# Patient Record
Sex: MALE | Race: OTHER | Employment: OTHER | URBAN - METROPOLITAN AREA
[De-identification: names, ages, dates, MRNs, and addresses within clinical notes are randomized per-mention and may not be internally consistent; named-entity substitution may affect disease eponyms.]

---

## 2021-08-07 ENCOUNTER — HOSPITAL ENCOUNTER (EMERGENCY)
Age: 36
Discharge: HOME | End: 2021-08-07
Payer: COMMERCIAL

## 2021-08-07 VITALS
BODY MASS INDEX: 26.97 KG/M2 | HEIGHT: 69 IN | RESPIRATION RATE: 19 BRPM | DIASTOLIC BLOOD PRESSURE: 71 MMHG | TEMPERATURE: 98.6 F | HEART RATE: 68 BPM | OXYGEN SATURATION: 98 % | WEIGHT: 182.1 LBS | SYSTOLIC BLOOD PRESSURE: 134 MMHG

## 2021-08-07 DIAGNOSIS — R19.7 DIARRHEA, UNSPECIFIED TYPE: Primary | ICD-10-CM

## 2021-08-07 LAB
A/G RATIO: 1.2 (ref 1.1–2.2)
ALBUMIN SERPL-MCNC: 4.2 G/DL (ref 3.4–5)
ALP BLD-CCNC: 127 U/L (ref 40–129)
ALT SERPL-CCNC: 42 U/L (ref 10–40)
ANION GAP SERPL CALCULATED.3IONS-SCNC: 10 MMOL/L (ref 3–16)
AST SERPL-CCNC: 29 U/L (ref 15–37)
BASOPHILS ABSOLUTE: 0.1 K/UL (ref 0–0.2)
BASOPHILS RELATIVE PERCENT: 1.1 %
BILIRUB SERPL-MCNC: 0.4 MG/DL (ref 0–1)
BILIRUBIN URINE: NEGATIVE
BLOOD, URINE: NEGATIVE
BUN BLDV-MCNC: 10 MG/DL (ref 7–20)
CALCIUM SERPL-MCNC: 9.5 MG/DL (ref 8.3–10.6)
CHLORIDE BLD-SCNC: 100 MMOL/L (ref 99–110)
CLARITY: CLEAR
CO2: 25 MMOL/L (ref 21–32)
COLOR: YELLOW
CREAT SERPL-MCNC: 0.7 MG/DL (ref 0.9–1.3)
EOSINOPHILS ABSOLUTE: 0.1 K/UL (ref 0–0.6)
EOSINOPHILS RELATIVE PERCENT: 0.7 %
GFR AFRICAN AMERICAN: >60
GFR NON-AFRICAN AMERICAN: >60
GLOBULIN: 3.4 G/DL
GLUCOSE BLD-MCNC: 90 MG/DL (ref 70–99)
GLUCOSE URINE: NEGATIVE MG/DL
HCT VFR BLD CALC: 44.1 % (ref 40.5–52.5)
HEMOGLOBIN: 15 G/DL (ref 13.5–17.5)
KETONES, URINE: NEGATIVE MG/DL
LEUKOCYTE ESTERASE, URINE: NEGATIVE
LIPASE: 33 U/L (ref 13–60)
LYMPHOCYTES ABSOLUTE: 3 K/UL (ref 1–5.1)
LYMPHOCYTES RELATIVE PERCENT: 36 %
MCH RBC QN AUTO: 31.3 PG (ref 26–34)
MCHC RBC AUTO-ENTMCNC: 34 G/DL (ref 31–36)
MCV RBC AUTO: 92 FL (ref 80–100)
MICROSCOPIC EXAMINATION: NORMAL
MONOCYTES ABSOLUTE: 1.1 K/UL (ref 0–1.3)
MONOCYTES RELATIVE PERCENT: 13.4 %
NEUTROPHILS ABSOLUTE: 4 K/UL (ref 1.7–7.7)
NEUTROPHILS RELATIVE PERCENT: 48.8 %
NITRITE, URINE: NEGATIVE
PDW BLD-RTO: 13.9 % (ref 12.4–15.4)
PH UA: 5.5 (ref 5–8)
PLATELET # BLD: 205 K/UL (ref 135–450)
PMV BLD AUTO: 9.4 FL (ref 5–10.5)
POTASSIUM REFLEX MAGNESIUM: 3.9 MMOL/L (ref 3.5–5.1)
PROTEIN UA: NEGATIVE MG/DL
RBC # BLD: 4.79 M/UL (ref 4.2–5.9)
SODIUM BLD-SCNC: 135 MMOL/L (ref 136–145)
SPECIFIC GRAVITY UA: 1 (ref 1–1.03)
TOTAL PROTEIN: 7.6 G/DL (ref 6.4–8.2)
URINE REFLEX TO CULTURE: NORMAL
URINE TYPE: NORMAL
UROBILINOGEN, URINE: 0.2 E.U./DL
WBC # BLD: 8.2 K/UL (ref 4–11)

## 2021-08-07 PROCEDURE — 2580000003 HC RX 258: Performed by: NURSE PRACTITIONER

## 2021-08-07 PROCEDURE — 6370000000 HC RX 637 (ALT 250 FOR IP): Performed by: NURSE PRACTITIONER

## 2021-08-07 PROCEDURE — 83690 ASSAY OF LIPASE: CPT

## 2021-08-07 PROCEDURE — 99283 EMERGENCY DEPT VISIT LOW MDM: CPT

## 2021-08-07 PROCEDURE — 36415 COLL VENOUS BLD VENIPUNCTURE: CPT

## 2021-08-07 PROCEDURE — 85025 COMPLETE CBC W/AUTO DIFF WBC: CPT

## 2021-08-07 PROCEDURE — 80053 COMPREHEN METABOLIC PANEL: CPT

## 2021-08-07 PROCEDURE — 81003 URINALYSIS AUTO W/O SCOPE: CPT

## 2021-08-07 RX ORDER — 0.9 % SODIUM CHLORIDE 0.9 %
1000 INTRAVENOUS SOLUTION INTRAVENOUS ONCE
Status: COMPLETED | OUTPATIENT
Start: 2021-08-07 | End: 2021-08-07

## 2021-08-07 RX ORDER — DIPHENOXYLATE HYDROCHLORIDE AND ATROPINE SULFATE 2.5; .025 MG/1; MG/1
2 TABLET ORAL ONCE
Status: COMPLETED | OUTPATIENT
Start: 2021-08-07 | End: 2021-08-07

## 2021-08-07 RX ORDER — SACCHAROMYCES BOULARDII 250 MG
250 CAPSULE ORAL 3 TIMES DAILY
Qty: 21 CAPSULE | Refills: 0 | Status: SHIPPED | OUTPATIENT
Start: 2021-08-07 | End: 2021-08-14

## 2021-08-07 RX ORDER — LOPERAMIDE HYDROCHLORIDE 2 MG/1
2 CAPSULE ORAL 4 TIMES DAILY PRN
Qty: 30 CAPSULE | Refills: 0 | Status: SHIPPED | OUTPATIENT
Start: 2021-08-07

## 2021-08-07 RX ADMIN — DIPHENOXYLATE HYDROCHLORIDE AND ATROPINE SULFATE 2 TABLET: 2.5; .025 TABLET ORAL at 20:05

## 2021-08-07 RX ADMIN — SODIUM CHLORIDE 1000 ML: 9 INJECTION, SOLUTION INTRAVENOUS at 20:06

## 2021-08-07 ASSESSMENT — ENCOUNTER SYMPTOMS
BACK PAIN: 0
VOMITING: 0
ABDOMINAL DISTENTION: 0
ABDOMINAL PAIN: 0
DIARRHEA: 1
COLOR CHANGE: 0
NAUSEA: 0
SHORTNESS OF BREATH: 0
BLOOD IN STOOL: 0

## 2021-08-07 ASSESSMENT — PAIN SCALES - GENERAL: PAINLEVEL_OUTOF10: 0

## 2021-08-07 NOTE — ED PROVIDER NOTES
reviewed and are negative. \"Positives and Pertinent negatives as per HPI\"    Physical Exam:  Physical Exam  Vitals and nursing note reviewed. Constitutional:       General: He is not in acute distress. Appearance: Normal appearance. He is well-developed. He is not toxic-appearing. HENT:      Head: Normocephalic and atraumatic. Eyes:      General: No scleral icterus. Conjunctiva/sclera: Conjunctivae normal.   Neck:      Vascular: No JVD. Cardiovascular:      Rate and Rhythm: Normal rate and regular rhythm. Heart sounds: Normal heart sounds. Pulmonary:      Effort: Pulmonary effort is normal. No respiratory distress. Breath sounds: Normal breath sounds. Abdominal:      General: There is no distension. Palpations: Abdomen is soft. Abdomen is not rigid. Tenderness: There is no abdominal tenderness. There is no guarding or rebound. Comments: No abdominal ttp   Musculoskeletal:         General: Normal range of motion. Cervical back: Normal range of motion and neck supple. Skin:     General: Skin is warm and dry. Findings: No rash. Neurological:      General: No focal deficit present. Mental Status: He is alert and oriented to person, place, and time.    Psychiatric:         Mood and Affect: Mood normal.         MEDICAL DECISION MAKING    Vitals:    Vitals:    08/07/21 1810   BP: 118/81   Pulse: 70   Resp: 19   Temp: 97.8 °F (36.6 °C)   SpO2: 98%   Weight: 182 lb 1.6 oz (82.6 kg)   Height: 5' 9\" (1.753 m)       LABS:  Labs Reviewed   COMPREHENSIVE METABOLIC PANEL W/ REFLEX TO MG FOR LOW K - Abnormal; Notable for the following components:       Result Value    Sodium 135 (*)     CREATININE 0.7 (*)     ALT 42 (*)     All other components within normal limits    Narrative:     Performed at:  [address] [address]   Phone [phone]   CBC WITH AUTO DIFFERENTIAL    Narrative:     Performed This was explained to the patient. The patient was advised that persistent or worsening symptoms will require further evaluation. I discussed with Amy Rivero and/or family the exam results, diagnosis, care, prognosis, reasons to return and the importance of follow up. Patient and/or family is in full agreement with plan and all questions have been answered. Specific discharge instructions explained, including reasons to return to the emergency department. Amy Rivero is well appearing, non-toxic, and afebrile at the time of discharge. Patient was instructed to follow up with primary care provider in 24-48 hours, and to instructed to return to ED immediately for any new or worsening concerns. Amy Rivero verbalized understanding and discharged home. The patient tolerated their visit well. I evaluated the patient. The physician was available for consultation as needed. The patient and / or the family were informed of the results of any tests, a time was given to answer questions, a plan was proposed and they agreed with plan. CLINICAL IMPRESSION:  1.  Diarrhea, unspecified type        DISPOSITION        PATIENT REFERRED TO:  [facility]) Pre-Services  [phone]  Call       [facility] Emergency Department  [address] [place] [address]  [phone]  Go to   As needed      DISCHARGE MEDICATIONS:  New Prescriptions    LOPERAMIDE (RA ANTI-DIARRHEAL) 2 MG CAPSULE    Take 1 capsule by mouth 4 times daily as needed for Diarrhea    SACCHAROMYCES BOULARDII (FLORASTOR) 250 MG CAPSULE    Take 1 capsule by mouth 3 times daily for 7 days       DISCONTINUED MEDICATIONS:  Discontinued Medications    No medications on file              (Please note the MDM and HPI sections of this note were completed with a voice recognition program.  Efforts were made to edit the dictations but occasionally words are mis-transcribed.)    Electronically signed, REINIER Diaz CNP,

## 2022-12-13 ENCOUNTER — HOSPITAL ENCOUNTER (EMERGENCY)
Age: 37
Discharge: HOME | End: 2022-12-13
Payer: COMMERCIAL

## 2022-12-13 VITALS
HEART RATE: 66 BPM | BODY MASS INDEX: 27.58 KG/M2 | SYSTOLIC BLOOD PRESSURE: 127 MMHG | RESPIRATION RATE: 16 BRPM | DIASTOLIC BLOOD PRESSURE: 80 MMHG | OXYGEN SATURATION: 100 % | WEIGHT: 186.73 LBS | TEMPERATURE: 97 F

## 2022-12-13 DIAGNOSIS — H93.8X3 EAR FULLNESS, BILATERAL: Primary | ICD-10-CM

## 2022-12-13 PROCEDURE — 99283 EMERGENCY DEPT VISIT LOW MDM: CPT

## 2022-12-13 RX ORDER — LORATADINE 10 MG/1
10 TABLET ORAL DAILY
Qty: 30 TABLET | Refills: 1 | Status: SHIPPED | OUTPATIENT
Start: 2022-12-13

## 2022-12-13 ASSESSMENT — PAIN - FUNCTIONAL ASSESSMENT
PAIN_FUNCTIONAL_ASSESSMENT: 0-10
PAIN_FUNCTIONAL_ASSESSMENT: 0-10

## 2022-12-13 ASSESSMENT — PAIN SCALES - GENERAL
PAINLEVEL_OUTOF10: 0
PAINLEVEL_OUTOF10: 5

## 2022-12-13 ASSESSMENT — PAIN DESCRIPTION - ORIENTATION: ORIENTATION: LEFT;RIGHT

## 2022-12-13 ASSESSMENT — PAIN DESCRIPTION - LOCATION: LOCATION: EAR

## 2022-12-13 NOTE — ED PROVIDER NOTES
[address]  [address] [address]  Dept: [phone]  Loc: [address] ENCOUNTER        This patient was not seen or evaluated by the attending physician. I evaluated this patient, the attending physician was available for consultation. CHIEF COMPLAINT    Chief Complaint   Patient presents with    Ear Fullness     Pt states ear pain and fullness in both ears        HPI    Shea Huber is a 42 y.o. male who presents with ear pain and fullness, localized on the right and left side. Onset was \"a while ago\". The duration has been constant since the onset. Patient complains of associated muffled hearing at times as well. He is also concerned about wax buildup. No aggravating or alleviating factors. He is requesting an ENT referral due to the chronicity of his symptoms. He has no further complaints. REVIEW OF SYSTEMS    Pulmonary: No difficulty breathing   General: No fevers  GI: No vomiting  ENT: see HPI    PAST MEDICAL AND SURGICAL HISTORY    No past medical history on file. No past surgical history on file. CURRENT MEDICATIONS  (may include discharge medications prescribed in the ED)  Current Outpatient Rx   Medication Sig Dispense Refill    loratadine (CLARITIN) 10 MG tablet Take 1 tablet by mouth daily 30 tablet 1    loperamide (RA ANTI-DIARRHEAL) 2 MG capsule Take 1 capsule by mouth 4 times daily as needed for Diarrhea 30 capsule 0       ALLERGIES    No Known Allergies    FAMILY AND SOCIAL HISTORY    No family history on file.   Social History     Socioeconomic History    Marital status:        PHYSICAL EXAM    VITAL SIGNS: /80   Pulse 80   Temp 97.9 °F (36.6 °C) (Oral)   Resp 20   Wt 186 lb 11.7 oz (84.7 kg)   SpO2 100%   BMI 27.58 kg/m²   Constitutional:  Well nourished, no acute distress  Eyes: Sclera nonicteric, conjunctiva normal   Throat/Face:  nonerythematous throat, no trismus, no lymphadenopathy  Ears: The right ear canal appears clear with no impacted wax and the ipsilateral TM appears clear and nonbulging, mastoid and pinna are without redness or swelling, no mastoid tenderness. The left ear canal appears clear with no impacted wax and the ipsilateral TM appears clear and nonbulging, mastoid and pinna are without redness or swelling, no mastoid tenderness    Respiratory:  No retractions  Cardiovascular:  No JVD  Neurologic: Awake, alert, no slurred speech    RADIOLOGY   No orders to display       ED [address] [facility]    Please see the medical record for medications prescribed. Differential diagnoses: Mastoiditis, Auricular cellulitis, Malignant otitis externa, Otitis media, Subarachnoid hemorrhage, Odontogenic infection, TMJ syndrome, eustachian tube dysfunction, other. Patient is afebrile and nontoxic in appearance. He will be discharged home with Claritin, referral to ENT, primary care. The patient was instructed to follow up as an outpatient in 2 days. The patient was instructed to return to the ED immediately for any new or worsening symptoms. The patient verbalized understanding. FINAL IMPRESSION    1.  Ear fullness, bilateral        PLAN  Discharge with outpatient follow-up and discharge Instructions (see EMR)      (Please note that this note was completed with a voice recognition program.  Every attempt was made to edit the dictations, but inevitably there remain words that are mis-transcribed.)         Kezia Archuleta [place]  12/13/22 8608

## 2022-12-13 NOTE — ED NOTES
Discharge and education instructions reviewed. Patient verbalized understanding, teach-back successful. Patient denied questions at this time. No acute distress noted. Patient instructed to follow-up as noted - return to emergency department if symptoms worsen. Patient verbalized understanding. Discharged per EDMD with discharge instructions.         Marleen Iraheta RN  12/13/22 6171

## 2022-12-20 ENCOUNTER — OFFICE VISIT (OUTPATIENT)
Dept: INTERNAL MEDICINE CLINIC | Age: 37
End: 2022-12-20

## 2022-12-20 VITALS
WEIGHT: 186.2 LBS | HEIGHT: 69 IN | HEART RATE: 92 BPM | OXYGEN SATURATION: 98 % | BODY MASS INDEX: 27.58 KG/M2 | SYSTOLIC BLOOD PRESSURE: 124 MMHG | RESPIRATION RATE: 18 BRPM | DIASTOLIC BLOOD PRESSURE: 84 MMHG

## 2022-12-20 DIAGNOSIS — H92.03 OTALGIA OF BOTH EARS: Primary | ICD-10-CM

## 2022-12-20 DIAGNOSIS — Z00.00 ROUTINE CHECK-UP: ICD-10-CM

## 2022-12-20 LAB
A/G RATIO: 1.2 (ref 1.1–2.2)
ALBUMIN SERPL-MCNC: 4.2 G/DL (ref 3.4–5)
ALP BLD-CCNC: 118 U/L (ref 40–129)
ALT SERPL-CCNC: 33 U/L (ref 10–40)
ANION GAP SERPL CALCULATED.3IONS-SCNC: 12 MMOL/L (ref 3–16)
AST SERPL-CCNC: 21 U/L (ref 15–37)
BASOPHILS ABSOLUTE: 0.1 K/UL (ref 0–0.2)
BASOPHILS RELATIVE PERCENT: 1.1 %
BILIRUB SERPL-MCNC: 0.6 MG/DL (ref 0–1)
BUN BLDV-MCNC: 22 MG/DL (ref 7–20)
CALCIUM SERPL-MCNC: 10.1 MG/DL (ref 8.3–10.6)
CHLORIDE BLD-SCNC: 99 MMOL/L (ref 99–110)
CHOLESTEROL, TOTAL: 224 MG/DL (ref 0–199)
CO2: 27 MMOL/L (ref 21–32)
CREAT SERPL-MCNC: 1 MG/DL (ref 0.9–1.3)
EOSINOPHILS ABSOLUTE: 0.4 K/UL (ref 0–0.6)
EOSINOPHILS RELATIVE PERCENT: 4 %
FOLATE: 11.89 NG/ML (ref 4.78–24.2)
GFR SERPL CREATININE-BSD FRML MDRD: >60 ML/MIN/{1.73_M2}
GLUCOSE BLD-MCNC: 89 MG/DL (ref 70–99)
HCT VFR BLD CALC: 46.8 % (ref 40.5–52.5)
HDLC SERPL-MCNC: 40 MG/DL (ref 40–60)
HEMOGLOBIN: 15.2 G/DL (ref 13.5–17.5)
LDL CHOLESTEROL CALCULATED: 135 MG/DL
LYMPHOCYTES ABSOLUTE: 4 K/UL (ref 1–5.1)
LYMPHOCYTES RELATIVE PERCENT: 41.7 %
MCH RBC QN AUTO: 31.4 PG (ref 26–34)
MCHC RBC AUTO-ENTMCNC: 32.5 G/DL (ref 31–36)
MCV RBC AUTO: 96.7 FL (ref 80–100)
MONOCYTES ABSOLUTE: 1 K/UL (ref 0–1.3)
MONOCYTES RELATIVE PERCENT: 10.8 %
NEUTROPHILS ABSOLUTE: 4.1 K/UL (ref 1.7–7.7)
NEUTROPHILS RELATIVE PERCENT: 42.4 %
PDW BLD-RTO: 14.1 % (ref 12.4–15.4)
PLATELET # BLD: 290 K/UL (ref 135–450)
PMV BLD AUTO: 9.4 FL (ref 5–10.5)
POTASSIUM SERPL-SCNC: 4.4 MMOL/L (ref 3.5–5.1)
RBC # BLD: 4.84 M/UL (ref 4.2–5.9)
SODIUM BLD-SCNC: 138 MMOL/L (ref 136–145)
TOTAL PROTEIN: 7.6 G/DL (ref 6.4–8.2)
TRIGL SERPL-MCNC: 244 MG/DL (ref 0–150)
VITAMIN B-12: 535 PG/ML (ref 211–911)
VITAMIN D 25-HYDROXY: 74.5 NG/ML
VLDLC SERPL CALC-MCNC: 49 MG/DL
WBC # BLD: 9.6 K/UL (ref 4–11)

## 2022-12-20 SDOH — ECONOMIC STABILITY: TRANSPORTATION INSECURITY
IN THE PAST 12 MONTHS, HAS LACK OF TRANSPORTATION KEPT YOU FROM MEETINGS, WORK, OR FROM GETTING THINGS NEEDED FOR DAILY LIVING?: NO

## 2022-12-20 SDOH — ECONOMIC STABILITY: FOOD INSECURITY: WITHIN THE PAST 12 MONTHS, YOU WORRIED THAT YOUR FOOD WOULD RUN OUT BEFORE YOU GOT MONEY TO BUY MORE.: NEVER TRUE

## 2022-12-20 SDOH — ECONOMIC STABILITY: FOOD INSECURITY: WITHIN THE PAST 12 MONTHS, THE FOOD YOU BOUGHT JUST DIDN'T LAST AND YOU DIDN'T HAVE MONEY TO GET MORE.: NEVER TRUE

## 2022-12-20 SDOH — ECONOMIC STABILITY: TRANSPORTATION INSECURITY
IN THE PAST 12 MONTHS, HAS THE LACK OF TRANSPORTATION KEPT YOU FROM MEDICAL APPOINTMENTS OR FROM GETTING MEDICATIONS?: NO

## 2022-12-20 ASSESSMENT — SOCIAL DETERMINANTS OF HEALTH (SDOH): HOW HARD IS IT FOR YOU TO PAY FOR THE VERY BASICS LIKE FOOD, HOUSING, MEDICAL CARE, AND HEATING?: NOT HARD AT ALL

## 2022-12-20 ASSESSMENT — PATIENT HEALTH QUESTIONNAIRE - PHQ9
SUM OF ALL RESPONSES TO PHQ QUESTIONS 1-9: 0
SUM OF ALL RESPONSES TO PHQ QUESTIONS 1-9: 0
SUM OF ALL RESPONSES TO PHQ9 QUESTIONS 1 & 2: 0
SUM OF ALL RESPONSES TO PHQ QUESTIONS 1-9: 0
1. LITTLE INTEREST OR PLEASURE IN DOING THINGS: 0
SUM OF ALL RESPONSES TO PHQ QUESTIONS 1-9: 0
2. FEELING DOWN, DEPRESSED OR HOPELESS: 0

## 2022-12-20 ASSESSMENT — ENCOUNTER SYMPTOMS
BLOOD IN STOOL: 0
ABDOMINAL PAIN: 0
CHEST TIGHTNESS: 0
ABDOMINAL DISTENTION: 0
SORE THROAT: 0
BACK PAIN: 0
SHORTNESS OF BREATH: 0
COUGH: 0

## 2022-12-20 NOTE — PROGRESS NOTES
[facility]) Internal Medicine  Mr. Alon Ojeda is a 68-year-old male with a past medical history as listed below who presents for routine follow-up. Bilateral ear fullness: Patient feels bilateral ear fullness. He notices difficulty with hearing. He has noticed wax draining from his ear. He denies any fevers or chills. Patient has not followed with a primary care doctor. He is interested in getting labs today. He wants to make sure that he does not have any vitamin deficiencies. Review of Systems   Constitutional:  Negative for fatigue and fever. HENT:  Negative for congestion, nosebleeds and sore throat. Respiratory:  Negative for cough, chest tightness and shortness of breath. Cardiovascular:  Negative for chest pain, palpitations and leg swelling. Gastrointestinal:  Negative for abdominal distention, abdominal pain and blood in stool. Endocrine: Negative for cold intolerance and heat intolerance. Genitourinary:  Negative for difficulty urinating. Musculoskeletal:  Negative for back pain. Neurological:  Negative for weakness, light-headedness and numbness. Psychiatric/Behavioral:  Negative for confusion and sleep disturbance. History reviewed. No pertinent past medical history. No past surgical history on file.     Social History     Socioeconomic History    Marital status:      Spouse name: Not on file    Number of children: Not on file    Years of education: Not on file    Highest education level: Not on file   Occupational History    Not on file   Tobacco Use    Smoking status: Never    Smokeless tobacco: Never   Substance and Sexual Activity    Alcohol use: Never    Drug use: Not on file    Sexual activity: Not on file   Other Topics Concern    Not on file   Social History Narrative    Not on file     Social Determinants of Health     Financial Resource Strain: Low Risk     Difficulty of Paying Living Expenses: Not hard at all   Food Insecurity: No Food Insecurity    Worried About Running Out of Food in the Last Year: Never true    [address] in the Last Year: Never true   Transportation Needs: No Transportation Needs    Lack of Transportation (Medical): No    Lack of Transportation (Non-Medical): No   Physical Activity: Not on file   Stress: Not on file   Social Connections: Not on file   Intimate Partner Violence: Not on file   Housing Stability: Not on file       No family history on file. No current outpatient medications on file. Examination  Vitals:    12/20/22 1400   BP: 124/84   Site: Left Upper Arm   Position: Sitting   Cuff Size: Large Adult   Pulse: 92   Resp: 18   SpO2: 98%   Weight: 186 lb 3.2 oz (84.5 kg)   Height: 5' 9\" (1.753 m)      Physical Exam  Constitutional:       General: He is not in acute distress. HENT:      Ears:      Comments: Cerumen in bilateral ear canals unable to visualize TM     Mouth/Throat:      Mouth: Mucous membranes are moist.   Eyes:      Pupils: Pupils are equal, round, and reactive to light. Cardiovascular:      Rate and Rhythm: Normal rate and regular rhythm. Pulses: Normal pulses. Pulmonary:      Effort: Pulmonary effort is normal. No respiratory distress. Breath sounds: Normal breath sounds. No wheezing, rhonchi or rales. Abdominal:      General: Abdomen is flat. There is no distension. Palpations: Abdomen is soft. Tenderness: There is no abdominal tenderness. Skin:     General: Skin is warm and dry. Coloration: Skin is not jaundiced or pale. Findings: No erythema. Neurological:      General: No focal deficit present. Mental Status: He is alert and oriented to person, place, and time. Assessment and Plan  Otalgia of both ears   Patient request ENT referral to have his ears cleaned. We did offer to clean out ears in office.   Patient prefers to go to ENT  -ENT referral    Routine check-up   - CBC CMP and vitamin D ordered       Discussed use, benefit, and side effects of prescribed medications. Barriers to medication compliance addressed. Discussed all ordered testing and labs. All patient questions answered. Patient agreeable with plan above. Please note that this chart was generated using dragon dictation software. Although every effort was made to ensure the accuracy of this automated transcription, some errors in transcription may have occurred.

## 2022-12-20 NOTE — ASSESSMENT & PLAN NOTE
Patient request ENT referral to have his ears cleaned. We did offer to clean out ears in office.   Patient prefers to go to ENT  -ENT referral

## 2022-12-20 NOTE — PATIENT INSTRUCTIONS
Call [phone] to schedule with ENT doctor.
urinary frequency and dysuria, as per  pt gets UTI's frequently. Pt also c/o back and supra pubic pain. A&ox4

## 2022-12-22 ENCOUNTER — TELEPHONE (OUTPATIENT)
Dept: INTERNAL MEDICINE CLINIC | Age: 37
End: 2022-12-22

## 2023-01-03 ENCOUNTER — OFFICE VISIT (OUTPATIENT)
Dept: ENT CLINIC | Age: 38
End: 2023-01-03
Payer: COMMERCIAL

## 2023-01-03 VITALS
HEART RATE: 73 BPM | RESPIRATION RATE: 16 BRPM | HEIGHT: 69 IN | SYSTOLIC BLOOD PRESSURE: 118 MMHG | OXYGEN SATURATION: 98 % | DIASTOLIC BLOOD PRESSURE: 84 MMHG | WEIGHT: 184 LBS | BODY MASS INDEX: 27.25 KG/M2

## 2023-01-03 DIAGNOSIS — R06.83 SNORING: ICD-10-CM

## 2023-01-03 DIAGNOSIS — J34.3 HYPERTROPHY OF BOTH INFERIOR NASAL TURBINATES: ICD-10-CM

## 2023-01-03 DIAGNOSIS — J31.2 CHRONIC PHARYNGITIS: ICD-10-CM

## 2023-01-03 DIAGNOSIS — H91.93 BILATERAL HEARING LOSS, UNSPECIFIED HEARING LOSS TYPE: Primary | ICD-10-CM

## 2023-01-03 DIAGNOSIS — H93.8X3 SENSATION OF FULLNESS IN BOTH EARS: ICD-10-CM

## 2023-01-03 DIAGNOSIS — J34.2 DEVIATED NASAL SEPTUM: ICD-10-CM

## 2023-01-03 DIAGNOSIS — J35.1 TONSILLAR HYPERTROPHY: ICD-10-CM

## 2023-01-03 PROCEDURE — 99203 OFFICE O/P NEW LOW 30 MIN: CPT | Performed by: STUDENT IN AN ORGANIZED HEALTH CARE EDUCATION/TRAINING PROGRAM

## 2023-01-03 PROCEDURE — 1036F TOBACCO NON-USER: CPT | Performed by: STUDENT IN AN ORGANIZED HEALTH CARE EDUCATION/TRAINING PROGRAM

## 2023-01-03 PROCEDURE — G8427 DOCREV CUR MEDS BY ELIG CLIN: HCPCS | Performed by: STUDENT IN AN ORGANIZED HEALTH CARE EDUCATION/TRAINING PROGRAM

## 2023-01-03 PROCEDURE — G8419 CALC BMI OUT NRM PARAM NOF/U: HCPCS | Performed by: STUDENT IN AN ORGANIZED HEALTH CARE EDUCATION/TRAINING PROGRAM

## 2023-01-03 PROCEDURE — G8484 FLU IMMUNIZE NO ADMIN: HCPCS | Performed by: STUDENT IN AN ORGANIZED HEALTH CARE EDUCATION/TRAINING PROGRAM

## 2023-01-03 NOTE — PROGRESS NOTES
[facility]  DIVISION OF OTOLARYNGOLOGY- HEAD & NECK SURGERY  CONSULT      Juan A Carlos (:  1985) is a 42 y.o. male, here for evaluation of the following chief complaint(s):  Cerumen Impaction (Bilateral)      ASSESSMENT/PLAN:  1. Bilateral hearing loss, unspecified hearing loss type  2. Sensation of fullness in both ears  3. Tonsillar hypertrophy  4. Chronic pharyngitis  5. Snoring  6. Deviated nasal septum  7. Hypertrophy of both inferior nasal turbinates      This is a very pleasant 42 y.o. male here today for evaluation of the the above-noted complaints. Cerumen removed from external auditory canals on exam.  Discussed with the patient cerumen management strategies. Recommend that the patient follow-up with an audiogram based on his degree of hearing loss, is more than I would expect based on the amount of cerumen he had in his ears. Patient has a history of nasal congestion, snoring chronic pharyngitis the setting of nasal obstruction with deviated nasal septum and turbinate hypertrophy and tonsillar hypertrophy. I recommended that the patient undergo a sleep study. He would like to consider this. Discussed with the patient that snoring is multifactorial.  Tonsillectomy could be done to address his chronic pharyngitis. He would like to think about this. Medical Decision Making: The following items were considered in medical decision making:  Independent review of images  Review / order clinical lab tests  Review / order radiology tests  Decision to obtain old records  Review and summation of old records as accessed through [facility] if applicable    SUBJECTIVE/OBJECTIVE:  ARIEL Pryor is here today for evaluation of possible complaints. The patient states that his ears feel full, he has evidence of earwax coming out of his ear canals and he feels like his hearing is diminished. He has never had his ears cleaned in the past.  No history of prior ear surgery.   No tinnitus. No dizziness or vertigo. Patient also has a history of chronic pharyngitis. He gets sore throats at least once a month. He has been on antibiotics for this in the past.  Patient also has intermittent difficulty breathing through his nose and nasal obstruction. Is not using medications in his nose. He admits to snoring, but no witnessed apneas. No issues related to increased daytime sleepiness. No frequent sinus infections. REVIEW OF SYSTEMS  The following systems were reviewed and revealed the following in addition to any already discussed in the HPI:    PHYSICAL EXAM    GENERAL: No acute distress, alert and oriented, no hoarseness, strong voice  EYES: EOMI, Anti-icteric  HENT:   Head: Normocephalic and atraumatic. Face:  Symmetric, facial nerve intact  Mouth/Oral Cavity:  normal lips, Uvula is midline, no mucosal lesions, no trismus, normal dentition, normal salivary quality/flow  Oropharynx/Larynx:  normal oropharynx, 4+ tonsils  Nose:Normal external nasal appearance. Anterior rhinoscopy shows  a deviated septum preventing view posteriorly. Significant hypertrophy of turbinates. Normal mucosa   NECK: Normal range of motion, no thyromegaly, trachea is midline, no lymphadenopathy, no neck masses, no crepitus  CHEST: Normal respiratory effort, no retractions, breathing comfortably  SKIN: No rashes, normal appearing skin, no evidence of skin lesions/tumors  Neuro:  cranial nerve II-XII intact; normal gait  Cardio:  no edema        PROCEDURE    Use of Operating Microscope and Cerumen Removal CPT code [id number]  Indications: Bilateral cerumen impaction obstructing visualization of the tympanic membrane(s). An operating microscope was utilized to visualize the external auditory canals using a speculum. The external auditory canals were occluded with cerumen bilaterally.  The cerumen and debris was removed with instrumentation including suction and currettes under microscopic evaluation. The bilateral tympanic membrane(s) and ossicles are intact. No fluid was visualized in the bilateral middle ears. This note was generated completely or in part utilizing Dragon dictation speech recognition software. Occasionally, words are mistranscribed and despite editing, the text may contain inaccuracies due to incorrect word recognition. If further clarification is needed please contact the office at [phone]. An electronic signature was used to authenticate this note.     --Hitesh Keith MD

## 2023-01-12 ENCOUNTER — APPOINTMENT (OUTPATIENT)
Dept: GENERAL RADIOLOGY | Age: 38
End: 2023-01-12
Payer: COMMERCIAL

## 2023-01-12 ENCOUNTER — HOSPITAL ENCOUNTER (EMERGENCY)
Age: 38
Discharge: HOME | End: 2023-01-13
Payer: COMMERCIAL

## 2023-01-12 VITALS
HEART RATE: 87 BPM | OXYGEN SATURATION: 96 % | DIASTOLIC BLOOD PRESSURE: 83 MMHG | RESPIRATION RATE: 16 BRPM | SYSTOLIC BLOOD PRESSURE: 120 MMHG | TEMPERATURE: 97.7 F

## 2023-01-12 DIAGNOSIS — R07.89 CHEST WALL PAIN: Primary | ICD-10-CM

## 2023-01-12 DIAGNOSIS — R74.01 ELEVATED ALANINE AMINOTRANSFERASE (ALT) LEVEL: ICD-10-CM

## 2023-01-12 LAB
A/G RATIO: 1.1 (ref 1.1–2.2)
ALBUMIN SERPL-MCNC: 3.8 G/DL (ref 3.4–5)
ALP BLD-CCNC: 114 U/L (ref 40–129)
ALT SERPL-CCNC: 49 U/L (ref 10–40)
ANION GAP SERPL CALCULATED.3IONS-SCNC: 9 MMOL/L (ref 3–16)
AST SERPL-CCNC: 30 U/L (ref 15–37)
BASOPHILS ABSOLUTE: 0.2 K/UL (ref 0–0.2)
BASOPHILS RELATIVE PERCENT: 1.5 %
BILIRUB SERPL-MCNC: 0.3 MG/DL (ref 0–1)
BUN BLDV-MCNC: 24 MG/DL (ref 7–20)
CALCIUM SERPL-MCNC: 9.3 MG/DL (ref 8.3–10.6)
CHLORIDE BLD-SCNC: 103 MMOL/L (ref 99–110)
CO2: 23 MMOL/L (ref 21–32)
CREAT SERPL-MCNC: 1 MG/DL (ref 0.9–1.3)
EOSINOPHILS ABSOLUTE: 0.5 K/UL (ref 0–0.6)
EOSINOPHILS RELATIVE PERCENT: 5.1 %
GFR SERPL CREATININE-BSD FRML MDRD: >60 ML/MIN/{1.73_M2}
GLUCOSE BLD-MCNC: 96 MG/DL (ref 70–99)
HCT VFR BLD CALC: 44.9 % (ref 40.5–52.5)
HEMOGLOBIN: 14.9 G/DL (ref 13.5–17.5)
LYMPHOCYTES ABSOLUTE: 4.1 K/UL (ref 1–5.1)
LYMPHOCYTES RELATIVE PERCENT: 39.4 %
MCH RBC QN AUTO: 31.7 PG (ref 26–34)
MCHC RBC AUTO-ENTMCNC: 33.1 G/DL (ref 31–36)
MCV RBC AUTO: 95.7 FL (ref 80–100)
MONOCYTES ABSOLUTE: 1.1 K/UL (ref 0–1.3)
MONOCYTES RELATIVE PERCENT: 10.3 %
NEUTROPHILS ABSOLUTE: 4.6 K/UL (ref 1.7–7.7)
NEUTROPHILS RELATIVE PERCENT: 43.7 %
PDW BLD-RTO: 13.7 % (ref 12.4–15.4)
PLATELET # BLD: 303 K/UL (ref 135–450)
PMV BLD AUTO: 8.8 FL (ref 5–10.5)
POTASSIUM REFLEX MAGNESIUM: 3.9 MMOL/L (ref 3.5–5.1)
RBC # BLD: 4.69 M/UL (ref 4.2–5.9)
SODIUM BLD-SCNC: 135 MMOL/L (ref 136–145)
TOTAL PROTEIN: 7.2 G/DL (ref 6.4–8.2)
TROPONIN: <0.01 NG/ML
TROPONIN: <0.01 NG/ML
WBC # BLD: 10.4 K/UL (ref 4–11)

## 2023-01-12 PROCEDURE — 85025 COMPLETE CBC W/AUTO DIFF WBC: CPT

## 2023-01-12 PROCEDURE — 99285 EMERGENCY DEPT VISIT HI MDM: CPT

## 2023-01-12 PROCEDURE — 80053 COMPREHEN METABOLIC PANEL: CPT

## 2023-01-12 PROCEDURE — 6370000000 HC RX 637 (ALT 250 FOR IP): Performed by: NURSE PRACTITIONER

## 2023-01-12 PROCEDURE — 93005 ELECTROCARDIOGRAM TRACING: CPT | Performed by: NURSE PRACTITIONER

## 2023-01-12 PROCEDURE — 84484 ASSAY OF TROPONIN QUANT: CPT

## 2023-01-12 PROCEDURE — 71046 X-RAY EXAM CHEST 2 VIEWS: CPT

## 2023-01-12 RX ORDER — ACETAMINOPHEN 500 MG
500 TABLET ORAL 4 TIMES DAILY PRN
Qty: 28 TABLET | Refills: 0 | Status: SHIPPED | OUTPATIENT
Start: 2023-01-12 | End: 2023-01-19

## 2023-01-12 RX ORDER — LIDOCAINE 50 MG/G
1 PATCH TOPICAL DAILY
Qty: 10 PATCH | Refills: 0 | Status: SHIPPED | OUTPATIENT
Start: 2023-01-12 | End: 2023-01-20 | Stop reason: ALTCHOICE

## 2023-01-12 RX ORDER — ASPIRIN 81 MG/1
324 TABLET, CHEWABLE ORAL ONCE
Status: COMPLETED | OUTPATIENT
Start: 2023-01-12 | End: 2023-01-12

## 2023-01-12 RX ORDER — LIDOCAINE 4 G/G
1 PATCH TOPICAL ONCE
Status: DISCONTINUED | OUTPATIENT
Start: 2023-01-12 | End: 2023-01-13 | Stop reason: HOSPADM

## 2023-01-12 RX ORDER — ACETAMINOPHEN 500 MG
1000 TABLET ORAL ONCE
Status: COMPLETED | OUTPATIENT
Start: 2023-01-12 | End: 2023-01-12

## 2023-01-12 RX ADMIN — ACETAMINOPHEN 1000 MG: 500 TABLET ORAL at 19:56

## 2023-01-12 RX ADMIN — ASPIRIN 81 MG CHEWABLE TABLET 324 MG: 81 TABLET CHEWABLE at 19:54

## 2023-01-12 ASSESSMENT — PAIN - FUNCTIONAL ASSESSMENT
PAIN_FUNCTIONAL_ASSESSMENT: PREVENTS OR INTERFERES SOME ACTIVE ACTIVITIES AND ADLS
PAIN_FUNCTIONAL_ASSESSMENT: 0-10

## 2023-01-12 ASSESSMENT — PAIN SCALES - GENERAL: PAINLEVEL_OUTOF10: 6

## 2023-01-12 ASSESSMENT — PAIN DESCRIPTION - DESCRIPTORS: DESCRIPTORS: PRESSURE

## 2023-01-12 ASSESSMENT — PAIN DESCRIPTION - FREQUENCY: FREQUENCY: CONTINUOUS

## 2023-01-12 ASSESSMENT — PAIN DESCRIPTION - ORIENTATION: ORIENTATION: LEFT

## 2023-01-12 ASSESSMENT — PAIN DESCRIPTION - ONSET: ONSET: SUDDEN

## 2023-01-12 ASSESSMENT — PAIN DESCRIPTION - PAIN TYPE: TYPE: ACUTE PAIN

## 2023-01-12 ASSESSMENT — PAIN DESCRIPTION - LOCATION: LOCATION: CHEST

## 2023-01-12 ASSESSMENT — HEART SCORE: ECG: 0

## 2023-01-13 LAB
EKG ATRIAL RATE: 63 BPM
EKG ATRIAL RATE: 76 BPM
EKG DIAGNOSIS: NORMAL
EKG DIAGNOSIS: NORMAL
EKG P AXIS: 32 DEGREES
EKG P AXIS: 36 DEGREES
EKG P-R INTERVAL: 128 MS
EKG P-R INTERVAL: 128 MS
EKG Q-T INTERVAL: 346 MS
EKG Q-T INTERVAL: 382 MS
EKG QRS DURATION: 74 MS
EKG QRS DURATION: 76 MS
EKG QTC CALCULATION (BAZETT): 389 MS
EKG QTC CALCULATION (BAZETT): 390 MS
EKG R AXIS: 5 DEGREES
EKG R AXIS: 7 DEGREES
EKG T AXIS: 0 DEGREES
EKG T AXIS: 14 DEGREES
EKG VENTRICULAR RATE: 63 BPM
EKG VENTRICULAR RATE: 76 BPM
TROPONIN: <0.01 NG/ML

## 2023-01-13 PROCEDURE — 93010 ELECTROCARDIOGRAM REPORT: CPT | Performed by: INTERNAL MEDICINE

## 2023-01-13 PROCEDURE — 93005 ELECTROCARDIOGRAM TRACING: CPT | Performed by: NURSE PRACTITIONER

## 2023-01-13 NOTE — ED PROVIDER NOTES
The Ekg interpreted by me shows  normal sinus rhythm with a rate of 76  Axis is   36  QTc is  normal  Intervals and Durations are unremarkable.       ST Segments: normal  No prior ECG available for comparison          Kayla Kevin MD  01/12/23 7403

## 2023-01-13 NOTE — DISCHARGE INSTRUCTIONS
Return to emergency room for new or worsening symptoms including, not limited to developing chest pain accompanied by nausea, vomiting, dizziness, shortness of breath, passing out, feeling as if you are going to pass out, or other symptoms/concerns. Medication as prescribed. Please follow-up with your primary care doctor for reevaluation next 1-2 days. Your primary care doctor may recommend you follow-up with cardiology department for evaluation of persistent symptoms.

## 2023-01-15 ASSESSMENT — ENCOUNTER SYMPTOMS
NAUSEA: 0
SHORTNESS OF BREATH: 0
EYE PAIN: 0
ANAL BLEEDING: 0
VOMITING: 0
ABDOMINAL PAIN: 0
SORE THROAT: 0
COUGH: 0
BACK PAIN: 0

## 2023-01-15 NOTE — ED PROVIDER NOTES
[address] [facility]        Pt Name: Chad Rodney  MRN: [id number]  [place] 1985  Date of evaluation: 1/12/2023  Provider: REINIER Rashid - KARIME  PCP: Bj Pierre MD  Note Started: 6:30 PM EST 1/15/23      PINO. I have evaluated this patient. My supervising physician was available for consultation. CHIEF COMPLAINT       Chief Complaint   Patient presents with    Chest Pain       HISTORY OF PRESENT ILLNESS: 1 or more Elements     History from : Patient    Limitations to history : None    Chad Rodney is a 42 y.o. nontoxic, well appearing male appearing in no acute distress without pertinent medical history who presents to the emergency department with acute [email or web address] hrs. with \"sharp and bubbles, pressure\" 6/10 left-sided chest pain. The pain has been constant since onset. The pain is not pleuritic and does not radiate. Denies ripping or tearing sensation, nausea, vomiting, dizziness, presyncope, lightheadedness, diaphoresis, fever, chills, shortness of breath, cough, changeability smell/taste, hemoptysis, leg/calf pain or swelling, headaches, body aches, abdominal pain, diarrhea, other symptoms/concerns    Nursing Notes were all reviewed and agreed with or any disagreements were addressed in the HPI. REVIEW OF SYSTEMS :      Review of Systems   Constitutional:  Negative for chills, diaphoresis, fatigue and fever. HENT:  Negative for congestion and sore throat. Eyes:  Negative for pain and visual disturbance. Respiratory:  Negative for cough and shortness of breath. Cardiovascular:  Positive for chest pain. Negative for leg swelling. Gastrointestinal:  Negative for abdominal pain, anal bleeding, nausea and vomiting. Genitourinary:  Negative for difficulty urinating, dysuria, frequency and urgency. Musculoskeletal:  Negative for back pain and neck pain. Skin:  Negative for rash and wound.    Neurological: Negative for dizziness and light-headedness. Hematological: Negative. Psychiatric/Behavioral: Negative. Positives and Pertinent negatives as per HPI. SURGICAL HISTORY   History reviewed. No pertinent surgical history. [address]       Discharge Medication List as of 1/13/2023 12:31 AM          ALLERGIES     Patient has no known allergies. FAMILYHISTORY     History reviewed. No pertinent family history. SOCIAL HISTORY       Social History     Tobacco Use    Smoking status: Never    Smokeless tobacco: Never   Substance Use Topics    Alcohol use: Never       SCREENINGS        Aris Coma Scale  Eye Opening: Spontaneous  Best Verbal Response: Oriented  Best Motor Response: Obeys commands  Keyes Coma Scale Score: 15        Heart Score for chest pain patients  History: Slightly Suspicious  ECG: Normal  Patient Age: < 45 years  *Risk factors for Atherosclerotic disease: Cigarette smoking, Positive family History  Risk Factors: 1 or 2 risk factors  Troponin: < 1X normal limit  Heart Score Total: 1       CIWA Assessment  BP: 120/83  Heart Rate: 87           PHYSICAL EXAM  1 or more Elements     ED Triage Vitals [01/12/23 1944]   BP Temp Temp Source Heart Rate Resp SpO2 Height Weight   120/83 97.7 °F (36.5 °C) Oral 87 16 96 % -- --       Physical Exam  Vitals and nursing note reviewed. Constitutional:       Appearance: Normal appearance. He is not diaphoretic. HENT:      Head: Normocephalic and atraumatic. Right Ear: External ear normal.      Left Ear: External ear normal.      Nose: Nose normal.      Mouth/Throat:      Mouth: Mucous membranes are moist.   Eyes:      General:         Right eye: No discharge. Left eye: No discharge. Extraocular Movements: Extraocular movements intact. Cardiovascular:      Rate and Rhythm: Normal rate and regular rhythm. Heart sounds: No murmur heard. No friction rub. No gallop.    Pulmonary:      Effort: Pulmonary effort is normal. No respiratory distress. Abdominal:      Palpations: Abdomen is soft. Musculoskeletal:         General: Normal range of motion. Cervical back: Normal range of motion and neck supple. Skin:     General: Skin is warm and dry. Capillary Refill: Capillary refill takes less than 2 seconds. Findings: No erythema. Neurological:      Mental Status: He is alert and oriented to person, place, and time.    Psychiatric:         Mood and Affect: Mood normal.         Behavior: Behavior normal.       DIAGNOSTIC RESULTS   LABS:    I have reviewed and interpreted all of the currently available lab results from this visit:  Results for orders placed or performed during the hospital encounter of 01/12/23   CBC with Auto Differential   Result Value Ref Range    WBC 10.4 4.0 - 11.0 K/uL    RBC 4.69 4.20 - 5.90 M/uL    Hemoglobin 14.9 13.5 - 17.5 g/dL    Hematocrit 44.9 40.5 - 52.5 %    MCV 95.7 80.0 - 100.0 fL    MCH 31.7 26.0 - 34.0 pg    MCHC 33.1 31.0 - 36.0 g/dL    RDW 13.7 12.4 - 15.4 %    Platelets 038 151 - 721 K/uL    MPV 8.8 5.0 - 10.5 fL    Neutrophils % 43.7 %    Lymphocytes % 39.4 %    Monocytes % 10.3 %    Eosinophils % 5.1 %    Basophils % 1.5 %    Neutrophils Absolute 4.6 1.7 - 7.7 K/uL    Lymphocytes Absolute 4.1 1.0 - 5.1 K/uL    Monocytes Absolute 1.1 0.0 - 1.3 K/uL    Eosinophils Absolute 0.5 0.0 - 0.6 K/uL    Basophils Absolute 0.2 0.0 - 0.2 K/uL   Comprehensive Metabolic Panel w/ Reflex to MG   Result Value Ref Range    Sodium 135 (L) 136 - 145 mmol/L    Potassium reflex Magnesium 3.9 3.5 - 5.1 mmol/L    Chloride 103 99 - 110 mmol/L    CO2 23 21 - 32 mmol/L    Anion Gap 9 3 - 16    Glucose 96 70 - 99 mg/dL    BUN 24 (H) 7 - 20 mg/dL    Creatinine 1.0 0.9 - 1.3 mg/dL    Est, Glom Filt Rate >60 >60    Calcium 9.3 8.3 - 10.6 mg/dL    Total Protein 7.2 6.4 - 8.2 g/dL    Albumin 3.8 3.4 - 5.0 g/dL    Albumin/Globulin Ratio 1.1 1.1 - 2.2    Total Bilirubin 0.3 0.0 - 1.0 mg/dL    Alkaline Phosphatase 114 40 - 129 U/L    ALT 49 (H) 10 - 40 U/L    AST 30 15 - 37 U/L   Troponin   Result Value Ref Range    Troponin <0.01 <0.01 ng/mL   Troponin   Result Value Ref Range    Troponin <0.01 <0.01 ng/mL   Troponin   Result Value Ref Range    Troponin <0.01 <0.01 ng/mL   EKG 12 Lead   Result Value Ref Range    Ventricular Rate 76 BPM    Atrial Rate 76 BPM    P-R Interval 128 ms    QRS Duration 74 ms    Q-T Interval 346 ms    QTc Calculation (Bazett) 389 ms    P Axis 36 degrees    R Axis 5 degrees    T Axis 14 degrees    Diagnosis       Normal sinus rhythmConfirmed by Clarissa ALBARRAN MD ([id number]) on 1/13/2023 7:55:37 AM   EKG 12 Lead   Result Value Ref Range    Ventricular Rate 63 BPM    Atrial Rate 63 BPM    P-R Interval 128 ms    QRS Duration 76 ms    Q-T Interval 382 ms    QTc Calculation (Bazett) 390 ms    P Axis 32 degrees    R Axis 7 degrees    T Axis 0 degrees    Diagnosis       Normal sinus rhythm with sinus arrhythmiaConfirmed by Clarissa ALBARRAN MD ([id number]) on 1/13/2023 7:55:44 AM         When ordered only abnormal lab results are displayed. All other labs were within normal range or not returned as of this dictation. EKG: When ordered, EKG's are interpreted by the Emergency Department Physician in the absence of a cardiologist.  Please see their note for interpretation of EKG. RADIOLOGY:   Non-plain film images such as CT, Ultrasound and MRI are read by the radiologist. Plain radiographic images are visualized and preliminarily interpreted by the ED Provider with the below findings:    Interpretation per the Radiologist below, if available at the time of this note:    XR CHEST (2 VW)   Final Result   No acute abnormality identified.            Work-up reveals:  Initial troponin: <0.01  Delta troponin: <0.01  CBC: Negative for leukocytosis or anemia  Metabolic panel: Mild hyponatremia at 135, BUN elevated at 24 without other significant electrolyte derangement or renal dysfunction with mild elevation ALT at 52, otherwise unremarkable  CXR: As interpreted by me and confirmed by radiology identifying no acute abnormality  EKG: As interpreted by EMD, see note for details. PROCEDURES   Unless otherwise noted below, none     Procedures    CRITICAL CARE TIME (.cctime)   0 minutes    PAST MEDICAL HISTORY     Mr. Jean Pierre Valdez has no significant past medical history. Chronic Conditions affecting Care: None    EMERGENCY DEPARTMENT COURSE and DIFFERENTIAL DIAGNOSIS/MDM:   Vitals:    Vitals:    01/12/23 1944   BP: 120/83   Pulse: 87   Resp: 16   Temp: 97.7 °F (36.5 °C)   TempSrc: Oral   SpO2: 96%     Alternate diagnoses were considered but less likely based on history physical.   Patient presents to the emergency department with chest pain. Alternate diagnoses are less likely based on history and physical. Alternate diagnoses are less likely based on history and physical. Considered myocardial infarction, aortic dissection, pulmonary embolism, tension pneumothorax, endocarditis, GERD, and pneumonia but less likely based on history and physical. Considered MI but no ischemic changes on EKG and no elevation in troponin. Considered aortic dissection but less likely as no radiation of pain into back with ripping/tearing sensation, there are equal radial pulses bilaterally, and there is no midline pulsatile abdominal mass. Considered pulmonary embolism but less likely as no cough or hemoptysis, and no DVT symptoms. D-dimer was not obtained. Considered tension pneumothorax but less likely as no unilaterally diminished breath sounds or tracheal deviation. Considered endocarditis but less likely as no fever, murmur, or IV drug use. Considered GERD but less likely as no postprandial epigastric abdominal/throat burning. Considered pneumonia but less likely as no fever, chills, sweats, leukocytosis, cough, and no radiographic evidence of consolidation or infiltrates on imaging-CXR.          Patient was given the following medications:  Medications   aspirin chewable tablet 324 mg (324 mg Oral Given 1/12/23 1954)   acetaminophen (TYLENOL) tablet 1,000 mg (1,000 mg Oral Given 1/12/23 1956)   Lidocaine 4% patch placed transdermally          Is this patient to be included in the SEP-1 Core Measure due to severe sepsis or septic shock? No   Exclusion criteria - the patient is NOT to be included for SEP-1 Core Measure due to: Infection is not suspected    CONSULTS: (Who and What was discussed)  None  Discussion with Other Profesionals : None    Social determinants: Finding culturally competent care despite the fact he speaks [place]. Records Reviewed : None    CC/HPI Summary, DDx, ED Course, and Reassessment:   Patient presents emergency department with a cute onset today shortly prior to arrival with left-sided chest pain described as \"sharp and bubbles with pressure\". The severity is 6/10. He has had a few episodes of this type of discomfort and presents today at the urging of his mother-in-law. The pain has been constant since onset today. The pain is not pleuritic and does not radiate. I obtain baseline cardiac work-up with 2 negative troponins and no ischemic EKG changes on tracings x2, no anemia or leukocytosis-inconsistent with sepsis. No significant electrolyte derangement with minimal hyponatremia at 135, mild elevation in BUN with normal creatinine and GFR, minimal elevation [email or web address] but otherwise unremarkable. Also, CXR identifies no pneumonia, pneumothorax, pericardial effusion. Disposition Considerations (include 1 Tests not done, Shared Decision Making, Pt Expectation of Test or Tx.):     Patient endorses good improvement of his symptoms status post he received medications here in the emergency department.  Therefore, have low suspicion for the presence of emergent medical condition at this time feel that the patient pulsated appropriate discharged home with outpatient follow-up            Patient will be discharged home with outpatient follow-up. I will prescribe medication for symptomatic relief which will be  Tylenol by mouth and transdermal lidocaine patches. I am the Primary Clinician of Record. FINAL IMPRESSION      1. Chest wall pain    2.  Elevated alanine aminotransferase (ALT) level          DISPOSITION/PLAN     DISPOSITION Decision to Discharge    PATIENT REFERRED TO:  Dimitris Manning MD  [address]  [phone]    Call in 1 day      [facility] Emergency Department  [address] [facility]  [phone]  Go to   As needed      DISCHARGE MEDICATIONS:  Discharge Medication List as of 1/13/2023 12:31 AM        START taking these medications    Details   acetaminophen (TYLENOL) 500 MG tablet Take 1 tablet by mouth 4 times daily as needed for Pain, Disp-28 tablet, R-0Print      lidocaine (LIDODERM) 5 % Place 1 patch onto the skin daily for 10 days 12 hours on, 12 hours off., Disp-10 patch, R-0Print             DISCONTINUED MEDICATIONS:  Discharge Medication List as of 1/13/2023 12:31 AM                 (Please note that portions of this note were completed with a voice recognition program.  Efforts were made to edit the dictations but occasionally words are mis-transcribed.)    REINIER Enriquez CNP (electronically signed)            REINIER Enriquez CNP  01/15/23 1110

## 2023-01-19 PROBLEM — Z00.00 ROUTINE CHECK-UP: Status: RESOLVED | Noted: 2022-12-20 | Resolved: 2023-01-19

## 2023-01-20 ENCOUNTER — OFFICE VISIT (OUTPATIENT)
Dept: INTERNAL MEDICINE CLINIC | Age: 38
End: 2023-01-20
Payer: COMMERCIAL

## 2023-01-20 VITALS
BODY MASS INDEX: 27.08 KG/M2 | WEIGHT: 183.4 LBS | DIASTOLIC BLOOD PRESSURE: 70 MMHG | OXYGEN SATURATION: 99 % | SYSTOLIC BLOOD PRESSURE: 110 MMHG | HEART RATE: 98 BPM

## 2023-01-20 DIAGNOSIS — Z63.8 FAMILY CONFLICT: ICD-10-CM

## 2023-01-20 DIAGNOSIS — N50.89 TESTICLE SWELLING: ICD-10-CM

## 2023-01-20 DIAGNOSIS — S39.012A STRAIN OF LUMBAR REGION, INITIAL ENCOUNTER: Primary | ICD-10-CM

## 2023-01-20 PROBLEM — E78.2 MIXED HYPERLIPIDEMIA: Status: ACTIVE | Noted: 2021-08-17

## 2023-01-20 PROBLEM — M54.50 CHRONIC BILATERAL LOW BACK PAIN WITHOUT SCIATICA: Status: ACTIVE | Noted: 2019-10-25

## 2023-01-20 PROBLEM — G89.29 CHRONIC BILATERAL LOW BACK PAIN WITHOUT SCIATICA: Status: ACTIVE | Noted: 2019-10-25

## 2023-01-20 PROCEDURE — G8484 FLU IMMUNIZE NO ADMIN: HCPCS | Performed by: FAMILY MEDICINE

## 2023-01-20 PROCEDURE — 99204 OFFICE O/P NEW MOD 45 MIN: CPT | Performed by: FAMILY MEDICINE

## 2023-01-20 PROCEDURE — G8419 CALC BMI OUT NRM PARAM NOF/U: HCPCS | Performed by: FAMILY MEDICINE

## 2023-01-20 PROCEDURE — G8427 DOCREV CUR MEDS BY ELIG CLIN: HCPCS | Performed by: FAMILY MEDICINE

## 2023-01-20 PROCEDURE — 1036F TOBACCO NON-USER: CPT | Performed by: FAMILY MEDICINE

## 2023-01-20 RX ORDER — METHYLPREDNISOLONE 4 MG/1
TABLET ORAL
Qty: 21 TABLET | Refills: 0 | Status: SHIPPED | OUTPATIENT
Start: 2023-01-20 | End: 2023-01-26

## 2023-01-20 RX ORDER — LIDOCAINE 50 MG/G
1 PATCH TOPICAL DAILY
Qty: 30 PATCH | Refills: 0 | Status: SHIPPED | OUTPATIENT
Start: 2023-01-20 | End: 2023-02-19

## 2023-01-20 RX ORDER — IBUPROFEN 600 MG/1
600 TABLET ORAL 3 TIMES DAILY PRN
Qty: 270 TABLET | Refills: 1 | Status: SHIPPED | OUTPATIENT
Start: 2023-01-20

## 2023-01-20 RX ORDER — TIZANIDINE 4 MG/1
4 TABLET ORAL 3 TIMES DAILY PRN
Qty: 90 TABLET | Refills: 0 | Status: SHIPPED | OUTPATIENT
Start: 2023-01-20 | End: 2023-02-19

## 2023-01-20 SDOH — SOCIAL STABILITY - SOCIAL INSECURITY: OTHER SPECIFIED PROBLEMS RELATED TO PRIMARY SUPPORT GROUP: Z63.8

## 2023-01-20 ASSESSMENT — ENCOUNTER SYMPTOMS
COUGH: 0
CONSTIPATION: 0
SHORTNESS OF BREATH: 0
DIARRHEA: 0
NAUSEA: 0
VOMITING: 0

## 2023-01-20 ASSESSMENT — PATIENT HEALTH QUESTIONNAIRE - PHQ9
SUM OF ALL RESPONSES TO PHQ QUESTIONS 1-9: 0
2. FEELING DOWN, DEPRESSED OR HOPELESS: 0
DEPRESSION UNABLE TO ASSESS: PT REFUSES
SUM OF ALL RESPONSES TO PHQ QUESTIONS 1-9: 0
SUM OF ALL RESPONSES TO PHQ QUESTIONS 1-9: 0
1. LITTLE INTEREST OR PLEASURE IN DOING THINGS: 0
SUM OF ALL RESPONSES TO PHQ9 QUESTIONS 1 & 2: 0
SUM OF ALL RESPONSES TO PHQ QUESTIONS 1-9: 0

## 2023-01-20 NOTE — LETTER
[facility]  [address] [facility]  Phone: [phone]  Fax: [phone]    Shannon Cooley DO        January 20, 2023     Patient: Willa Peng   YOB: 1985   Date of Visit: 1/20/2023       To Whom It May Concern: It is my medical opinion that Willa Peng need to be off of work starting a month from 1/20/23    If you have any questions or concerns, please don't hesitate to call.     Sincerely,        Shannon Cooley DO

## 2023-01-20 NOTE — PROGRESS NOTES
Martínez Mcdonough (:  1985) is a 42 y.o. male,New patient, here for evaluation of the following chief complaint(s): Other (Back pain)      SUBJECTIVE:  23 -- new patient    Was in the shower this morning, today, feels like he strained his back after he bend down to grab something. He has had low back pain in the past which took approx 1 mo to heal before. Has strain of his back in 2016  and had to walk hunched over for 4 days. Works as a long distance  since , and sits for a long time. Discussed treatment, PT. Pt requesting time off from his current job, this is appropriate as he will have been working as  with long periods of sitting without moving    Pt also admits that sometimes he has some pain in his testicles. He feels like at times they are inflamed or swollen. He denies any trauma to them. States that after he has laid in bed they tend to feel better. Denies fever or discharge from his penis, no abscesses noted. Deferred  exam -- will order US for further evaluation. I have reviewed the chart notes available from myself and other providers. I have reviewed and addressed all active problems and created or updated the problems list in detail, as needed    I have extensively reviewed and reconciled the medication list, discontinued medications not taking or no longer appropriate, and updated the active meds list    OBJECTIVE:  Review of Systems   Constitutional:  Negative for chills and fever. Respiratory:  Negative for cough and shortness of breath. Cardiovascular:  Negative for leg swelling. Gastrointestinal:  Negative for constipation, diarrhea, nausea and vomiting. Endocrine: Negative for polyuria. Genitourinary:  Negative for frequency. Skin:  Negative for rash.      Vitals:    23 1059   BP: 110/70   Site: Left Upper Arm   Position: Sitting   Cuff Size: Medium Adult   Pulse: 98   SpO2: 99%   Weight: 183 lb 6.4 oz (83.2 kg) Body mass index is 27.08 kg/m². Physical Exam  Constitutional:       Appearance: Normal appearance. Cardiovascular:      Rate and Rhythm: Normal rate and regular rhythm. Pulses: Normal pulses. Heart sounds: Normal heart sounds. Pulmonary:      Effort: Pulmonary effort is normal.      Breath sounds: Normal breath sounds. Genitourinary:     Comments: deferred  Musculoskeletal:      Right lower leg: No edema. Left lower leg: No edema. Comments: Ttp to areas of lumbar spine L2-L5, as well as associated paraspinal musculature   Neurological:      General: No focal deficit present. Mental Status: He is alert. Mental status is at baseline. No results found for: LABA1C  Lab Results   Component Value Date    WBC 10.4 01/12/2023    HGB 14.9 01/12/2023    HCT 44.9 01/12/2023    MCV 95.7 01/12/2023     01/12/2023      Lab Results   Component Value Date/Time     01/12/2023 08:00 PM    K 3.9 01/12/2023 08:00 PM     01/12/2023 08:00 PM    CO2 23 01/12/2023 08:00 PM    BUN 24 01/12/2023 08:00 PM    CREATININE 1.0 01/12/2023 08:00 PM    GLUCOSE 96 01/12/2023 08:00 PM    CALCIUM 9.3 01/12/2023 08:00 PM       Lab Results   Component Value Date    CHOL 224 (H) 12/20/2022     Lab Results   Component Value Date    TRIG 244 (H) 12/20/2022     Lab Results   Component Value Date    HDL 40 12/20/2022     Lab Results   Component Value Date    LDLCALC 135 (H) 12/20/2022     Lab Results   Component Value Date    LABVLDL 49 12/20/2022     No results found for: CHOLHDLRATIO     The ASCVD Risk score (Elizabeth DK, et al., 2019) failed to calculate for the following reasons: The 2019 ASCVD risk score is only valid for ages 36 to 78     Patient received counseling and, if relevant, printed instructions for all symptoms listed in CC and HPI, as well as for all diagnoses brought onto today's visit note below.  Typical counseling includes, but is not limited to, non-pharmacologic measures to manage listed symptoms and conditions; appropriate use, risks and benefits for all prescribed medications; potential interactions between medications both prescribed and OTC; diet; exercise; healthy behaviors; and goalsetting to improve health. Patient or responsible party was involved in shared decision making and had opportunity to have all questions answered. Except as noted below, all chronic problems have been reviewed and are stable to continue medications or other therapy as previously documented in the patient's chart, with changes per orders or documentation below:    1. Strain of lumbar region, initial encounter  Comments:  will try steroid dose pack to decrease inflammation, time off slip. Also rx for lidocaine patches, muscle relaxant, nsaid  Orders:  -     methylPREDNISolone (MEDROL DOSEPACK) 4 MG tablet; Take by mouth., Disp-21 tablet, R-0Normal  -     lidocaine (LIDODERM) 5 %; Place 1 patch onto the skin daily 12 hours on, 12 hours off., Disp-30 patch, R-0Normal  -     tiZANidine (ZANAFLEX) 4 MG tablet; Take 1 tablet by mouth 3 times daily as needed (msucle spasms), Disp-90 tablet, R-0Normal  -     ibuprofen (ADVIL;MOTRIN) 600 MG tablet; Take 1 tablet by mouth 3 times daily as needed for Pain, Disp-270 tablet, R-1Normal  2. Testicle swelling  Comments:  will order US for evaluation  Orders:  -     [address]; Future  3. Family conflict        Problem List          Medium    Family conflict     Orders Placed This Encounter   Procedures    US SCROTUM AND TESTICLES     This procedure can be scheduled via [employer]. Access your [employer] account by visiting [email or web address]. Standing Status:   Future     Standing Expiration Date:   1/20/2024     Order Specific Question:   Reason for exam:     Answer:   testicular swelling and pain, on and off         Return in about 3 weeks (around 2/10/2023) for follow upon back pain.         Dr. Twin Moctezuma and T  [facility] - [facility]        Usual doctor's hours are:     Monday 7:30 am to 6:00 pm           Tuesday  7:30 am to 5:00 pm                                               Wednesday 7:30 am to 5:00 pm                                               Thursday 7:30 am to 5:00 pm                                               Friday 7:30 am to 4:00 pm           Saturdays, Sundays, and after hours: E-Visits are available    We observe most federal holidays and Good Friday. We ask that you only contact the office one time per issue or question, and please allow one full business day for a call back. Calling us back multiple times keeps us from being able to complete the work efficiently for you and our other patients. For medication renewals, please call your pharmacist to contact us, and be sure to allow at least 3 business days for processing before you need to  your medication. If you are sick or need an appointment that hasn't been planned, same day appointments are available every day the office is open: Monday, Tuesday, Wednesday, Thursday, and Friday. Call during office hours to schedule, even if it may not be with your regular physician. You may also call the office after 8 am on office days if you need to be seen from an issue the night before. During hours when the office is not normally open, your call will go to the messaging service which cannot provide any service other than paging the doctor. No prescriptions or other nonurgent matters will be handled and no voicemail is available, so please call back during office hours for these matters.        Electronically signed by Eduardo Morgan DO on 1/20/2023 at 1:19 PM.

## 2023-01-30 PROBLEM — Z63.8 FAMILY CONFLICT: Status: ACTIVE | Noted: 2023-01-30

## 2023-02-10 ENCOUNTER — OFFICE VISIT (OUTPATIENT)
Dept: INTERNAL MEDICINE CLINIC | Age: 38
End: 2023-02-10
Payer: COMMERCIAL

## 2023-02-10 VITALS
BODY MASS INDEX: 27.88 KG/M2 | OXYGEN SATURATION: 97 % | WEIGHT: 188.8 LBS | SYSTOLIC BLOOD PRESSURE: 137 MMHG | DIASTOLIC BLOOD PRESSURE: 87 MMHG | HEART RATE: 69 BPM

## 2023-02-10 DIAGNOSIS — S39.012D STRAIN OF LUMBAR REGION, SUBSEQUENT ENCOUNTER: ICD-10-CM

## 2023-02-10 DIAGNOSIS — M54.16 LUMBAR BACK PAIN WITH RADICULOPATHY AFFECTING RIGHT LOWER EXTREMITY: Primary | ICD-10-CM

## 2023-02-10 PROCEDURE — G8427 DOCREV CUR MEDS BY ELIG CLIN: HCPCS | Performed by: FAMILY MEDICINE

## 2023-02-10 PROCEDURE — G8419 CALC BMI OUT NRM PARAM NOF/U: HCPCS | Performed by: FAMILY MEDICINE

## 2023-02-10 PROCEDURE — 1036F TOBACCO NON-USER: CPT | Performed by: FAMILY MEDICINE

## 2023-02-10 PROCEDURE — G8484 FLU IMMUNIZE NO ADMIN: HCPCS | Performed by: FAMILY MEDICINE

## 2023-02-10 PROCEDURE — 99214 OFFICE O/P EST MOD 30 MIN: CPT | Performed by: FAMILY MEDICINE

## 2023-02-10 RX ORDER — TIZANIDINE 4 MG/1
4 TABLET ORAL 3 TIMES DAILY PRN
Qty: 90 TABLET | Refills: 1 | Status: SHIPPED | OUTPATIENT
Start: 2023-02-10 | End: 2023-04-11

## 2023-02-10 RX ORDER — LIDOCAINE 50 MG/G
1 PATCH TOPICAL DAILY
Qty: 30 PATCH | Refills: 0 | Status: SHIPPED | OUTPATIENT
Start: 2023-02-10 | End: 2023-03-12

## 2023-02-10 SDOH — ECONOMIC STABILITY: FOOD INSECURITY: WITHIN THE PAST 12 MONTHS, YOU WORRIED THAT YOUR FOOD WOULD RUN OUT BEFORE YOU GOT MONEY TO BUY MORE.: PATIENT DECLINED

## 2023-02-10 SDOH — ECONOMIC STABILITY: INCOME INSECURITY: HOW HARD IS IT FOR YOU TO PAY FOR THE VERY BASICS LIKE FOOD, HOUSING, MEDICAL CARE, AND HEATING?: PATIENT DECLINED

## 2023-02-10 SDOH — ECONOMIC STABILITY: HOUSING INSECURITY
IN THE LAST 12 MONTHS, WAS THERE A TIME WHEN YOU DID NOT HAVE A STEADY PLACE TO SLEEP OR SLEPT IN A SHELTER (INCLUDING NOW)?: PATIENT REFUSED

## 2023-02-10 SDOH — ECONOMIC STABILITY: FOOD INSECURITY: WITHIN THE PAST 12 MONTHS, THE FOOD YOU BOUGHT JUST DIDN'T LAST AND YOU DIDN'T HAVE MONEY TO GET MORE.: PATIENT DECLINED

## 2023-02-10 ASSESSMENT — ENCOUNTER SYMPTOMS
NAUSEA: 0
SHORTNESS OF BREATH: 0
COUGH: 0
VOMITING: 0
DIARRHEA: 0
BACK PAIN: 1
CONSTIPATION: 0

## 2023-02-10 NOTE — PROGRESS NOTES
Karley Burkett (:  1985) is a 42 y.o. male,Established patient, here for evaluation of the following chief complaint(s):  Back Pain and Shoulder Pain      SUBJECTIVE:  2.10.23: Follow up on lumbar spine pain  Still having pain, will refer to ortho    Having shooting pain down his right leg, worse with hip flexion and with standing. Feels better with laying flat or sitting down with his legs extended. 23 -- new patient    Was in the shower this morning, today, feels like he strained his back after he bend down to grab something. He has had low back pain in the past which took approx 1 mo to heal before. Has strain of his back in 2016  and had to walk hunched over for 4 days. Works as a long distance  since , and sits for a long time. Discussed treatment, PT. Pt requesting time off from his current job, this is appropriate as he will have been working as  with long periods of sitting without moving    Pt also admits that sometimes he has some pain in his testicles. He feels like at times they are inflamed or swollen. He denies any trauma to them. States that after he has laid in bed they tend to feel better. Denies fever or discharge from his penis, no abscesses noted. Deferred  exam -- will order US for further evaluation. Back Pain  Pertinent negatives include no fever. Shoulder Pain   Pertinent negatives include no fever. I have reviewed the chart notes available from myself and other providers. I have reviewed and addressed all active problems and created or updated the problems list in detail, as needed    I have extensively reviewed and reconciled the medication list, discontinued medications not taking or no longer appropriate, and updated the active meds list    OBJECTIVE:  Review of Systems   Constitutional:  Negative for chills and fever. Respiratory:  Negative for cough and shortness of breath.     Cardiovascular:  Negative for leg swelling. Gastrointestinal:  Negative for constipation, diarrhea, nausea and vomiting. Endocrine: Negative for polyuria. Genitourinary:  Negative for frequency. Musculoskeletal:  Positive for back pain. Skin:  Negative for rash. Vitals:    02/10/23 0836   BP: 137/87   Site: Right Upper Arm   Position: Sitting   Cuff Size: Medium Adult   Pulse: 69   SpO2: 97%   Weight: 188 lb 12.8 oz (85.6 kg)      Body mass index is 27.88 kg/m². Physical Exam  Constitutional:       Appearance: Normal appearance. Cardiovascular:      Rate and Rhythm: Normal rate and regular rhythm. Pulses: Normal pulses. Heart sounds: Normal heart sounds. Pulmonary:      Effort: Pulmonary effort is normal.      Breath sounds: Normal breath sounds. Genitourinary:     Comments: deferred  Musculoskeletal:      Right lower leg: No edema. Left lower leg: No edema. Comments: Ttp to areas of lumbar spine L2-L5, as well as associated paraspinal musculature   Neurological:      General: No focal deficit present. Mental Status: He is alert. Mental status is at baseline.          No results found for: LABA1C  Lab Results   Component Value Date    WBC 10.4 01/12/2023    HGB 14.9 01/12/2023    HCT 44.9 01/12/2023    MCV 95.7 01/12/2023     01/12/2023      Lab Results   Component Value Date/Time     01/12/2023 08:00 PM    K 3.9 01/12/2023 08:00 PM     01/12/2023 08:00 PM    CO2 23 01/12/2023 08:00 PM    BUN 24 01/12/2023 08:00 PM    CREATININE 1.0 01/12/2023 08:00 PM    GLUCOSE 96 01/12/2023 08:00 PM    CALCIUM 9.3 01/12/2023 08:00 PM       Lab Results   Component Value Date    CHOL 224 (H) 12/20/2022     Lab Results   Component Value Date    TRIG 244 (H) 12/20/2022     Lab Results   Component Value Date    HDL 40 12/20/2022     Lab Results   Component Value Date    LDLCALC 135 (H) 12/20/2022     Lab Results   Component Value Date    LABVLDL 49 12/20/2022     No results found for: CHOLHDLRATIO The ASCVD Risk score (Elizabeth OVERTON, et al., 2019) failed to calculate for the following reasons: The 2019 ASCVD risk score is only valid for ages 36 to 78     Patient received counseling and, if relevant, printed instructions for all symptoms listed in CC and HPI, as well as for all diagnoses brought onto today's visit note below. Typical counseling includes, but is not limited to, non-pharmacologic measures to manage listed symptoms and conditions; appropriate use, risks and benefits for all prescribed medications; potential interactions between medications both prescribed and OTC; diet; exercise; healthy behaviors; and goalsetting to improve health. Patient or responsible party was involved in shared decision making and had opportunity to have all questions answered. Except as noted below, all chronic problems have been reviewed and are stable to continue medications or other therapy as previously documented in the patient's chart, with changes per orders or documentation below:    1. Lumbar back pain with radiculopathy affecting right lower extremity  -     [address]  -     tiZANidine (ZANAFLEX) 4 MG tablet; Take 1 tablet by mouth 3 times daily as needed (msucle spasms), Disp-90 tablet, R-1Normal  -     lidocaine (LIDODERM) 5 %; Place 1 patch onto the skin daily 12 hours on, 12 hours off., Disp-30 patch, R-0Normal  2. Strain of lumbar region, subsequent encounter  Comments:  continue rx for lidocaine patches, muscle relaxant, nsaid  Orders:  -     [address]  -     tiZANidine (ZANAFLEX) 4 MG tablet;  Take 1 tablet by mouth 3 times daily as needed (msucle spasms), Disp-90 tablet, R-1Normal  -     lidocaine (LIDODERM) 5 %; Place 1 patch onto the skin daily 12 hours on, 12 hours off., Disp-30 patch, R-0Normal        Problem List    None  Orders Placed This Encounter   Procedures    [facility]     Referral Priority:   Routine     Referral Type:   Eval and Treat     Referral Reason:   Specialty Services Required     Requested Specialty:   Orthopedic Surgery     Number of Visits Requested:   [address] - [facility]     Referral Priority:   Routine     Referral Type:   Eval and Treat     Referral Reason:   Specialty Services Required     Requested Specialty:   Physical Therapist     Number of Visits Requested:   1           Return in about 6 weeks (around 3/24/2023) for follow up lumbar pain, radiculopathy. Dr. Michelle Koenig and [address] [place] [address] - Internal Medicine and Pediatrics        Usual doctor's hours are:     Monday 7:30 am to 6:00 pm           Tuesday  7:30 am to 5:00 pm                                               Wednesday 7:30 am to 5:00 pm                                               Thursday 7:30 am to 5:00 pm                                               Friday 7:30 am to 4:00 pm           Saturdays, Sundays, and after hours: E-Visits are available    We observe most federal holidays and Good Friday. We ask that you only contact the office one time per issue or question, and please allow one full business day for a call back. Calling us back multiple times keeps us from being able to complete the work efficiently for you and our other patients. For medication renewals, please call your pharmacist to contact us, and be sure to allow at least 3 business days for processing before you need to  your medication. If you are sick or need an appointment that hasn't been planned, same day appointments are available every day the office is open: Monday, Tuesday, Wednesday, Thursday, and Friday. Call during office hours to schedule, even if it may not be with your regular physician.  You may also call the office after 8 am on office days if you need to be seen from an issue the night before. During hours when the office is not normally open, your call will go to the messaging service which cannot provide any service other than paging the doctor. No prescriptions or other nonurgent matters will be handled and no voicemail is available, so please call back during office hours for these matters.        Electronically signed by Jorge L Douglas DO on 2/10/2023 at 10:06 AM.

## 2023-02-10 NOTE — LETTER
[facility] Internal Medicine and Pediatrics  [address] [facility]  Phone: [phone]  Fax: [phone]    Sandy Rojas DO        February 10, 2023     Patient: Luz Strange   YOB: 1985   Date of Visit: 2/10/2023       To Whom it May Concern:    Luz Strange was seen in my clinic on 2/10/2023. He will need time off to go to physical therapy . If you have any questions or concerns, please don't hesitate to call.     Sincerely,         Sandy Rojas DO

## 2023-02-15 ENCOUNTER — OFFICE VISIT (OUTPATIENT)
Dept: ORTHOPEDIC SURGERY | Age: 38
End: 2023-02-15
Payer: COMMERCIAL

## 2023-02-15 VITALS — HEIGHT: 69 IN | BODY MASS INDEX: 27.85 KG/M2 | WEIGHT: 188 LBS | RESPIRATION RATE: 18 BRPM

## 2023-02-15 DIAGNOSIS — M54.41 ACUTE RIGHT-SIDED LOW BACK PAIN WITH RIGHT-SIDED SCIATICA: Primary | ICD-10-CM

## 2023-02-15 PROCEDURE — 1036F TOBACCO NON-USER: CPT | Performed by: PHYSICIAN ASSISTANT

## 2023-02-15 PROCEDURE — G8419 CALC BMI OUT NRM PARAM NOF/U: HCPCS | Performed by: PHYSICIAN ASSISTANT

## 2023-02-15 PROCEDURE — G8427 DOCREV CUR MEDS BY ELIG CLIN: HCPCS | Performed by: PHYSICIAN ASSISTANT

## 2023-02-15 PROCEDURE — G8484 FLU IMMUNIZE NO ADMIN: HCPCS | Performed by: PHYSICIAN ASSISTANT

## 2023-02-15 PROCEDURE — 99204 OFFICE O/P NEW MOD 45 MIN: CPT | Performed by: PHYSICIAN ASSISTANT

## 2023-02-15 RX ORDER — TIZANIDINE 4 MG/1
4 TABLET ORAL 4 TIMES DAILY PRN
Qty: 60 TABLET | Refills: 0 | Status: SHIPPED | OUTPATIENT
Start: 2023-02-15

## 2023-02-15 RX ORDER — GABAPENTIN 100 MG/1
100 CAPSULE ORAL 3 TIMES DAILY
Qty: 90 CAPSULE | Refills: 0 | Status: SHIPPED | OUTPATIENT
Start: 2023-02-15 | End: 2023-03-17

## 2023-02-15 NOTE — PROGRESS NOTES
History of present illness:   Mr. Jaydon Peter is a pleasant 42 y.o. male kindly referred by Benji Oreilly DO for consultation regarding his LBP, right buttock and right leg pain. He states his pain began in 2016 after a weightlifting injury. Symptoms became significantly worse on 1/20/2023 when he bent down in the shower and pulled something in his lower back. His pain has steadily worsened since onset 1/20/2023. He rates his back pain 8/10 VAS and right leg pain 8/10 VAS. He describes the pain as aching, throbbing pain that radiates through the right buttocks down the right lateral calf. The leg pain radiates down the lateral aspect of his leg to his ankle. He reports numbness and tingling right lower extremity. He reports weakness of his right leg. He denies bowel or bladder dysfunction and saddle anesthesia. He states he can sit for a maximum of 30 minutes and stand for a maximum 60 minutes. The pain does affect his sleep. He has tried medications recently prescribed including a Medrol Dosepak, lidocaine patches, tizanidine 4 mg and ibuprofen which is helping some. He was prescribed physical therapy but has not started therapy yet. Past medical history:  His past medical history has been reviewed. History reviewed. No pertinent past medical history. His past surgical history has been reviewed. History reviewed. No pertinent surgical history. His medications and allergies were reviewed. Current Outpatient Medications   Medication Sig Dispense Refill    tiZANidine (ZANAFLEX) 4 MG tablet Take 1 tablet by mouth 4 times daily as needed (spasm) 60 tablet 0    gabapentin (NEURONTIN) 100 MG capsule Take 1 capsule by mouth 3 times daily for 30 days. Intended supply: 30 days 90 capsule 0    lidocaine (LIDODERM) 5 % Place 1 patch onto the skin daily 12 hours on, 12 hours off.  30 patch 0    ibuprofen (ADVIL;MOTRIN) 600 MG tablet Take 1 tablet by mouth 3 times daily as needed for Pain (Patient not taking: Reported on 2/10/2023) 270 tablet 1    acetaminophen (TYLENOL) 500 MG tablet Take 1 tablet by mouth 4 times daily as needed for Pain 28 tablet 0     No current facility-administered medications for this visit. His social history has been reviewed. Social History     Occupational History    Not on file   Tobacco Use    Smoking status: Never    Smokeless tobacco: Never   Substance and Sexual Activity    Alcohol use: Never    Drug use: Not on file    Sexual activity: Not on file         His family history has been reviewed. History reviewed. No pertinent family history. Review of Systems:  I have reviewed the clinically relevant past medical history, medications, allergies, family history, social history, and 13 point Review of Systems from the patient's recent history form & documented any details relevant to today's presenting complaints in the history above. The patient's self-reported past medical history, medications, allergies, family history, social history, and Review of Systems forms from today's date have been scanned into the chart under the \"Media\" tab. Patient's review of symptoms was reviewed and is significant for back pain and negative for recent weight loss, fatigue, chills, visual disturbances, blood in stool or urine, recent infection. Physical examination:  Mr. Janine Wiley most recent vitals:  Vitals  Resp: 18  Height: 5' 9\" (175.3 cm)  Weight: 188 lb (85.3 kg)  Body mass index is 27.76 kg/m². General exam:  He is well-developed and well-nourished, is in obvious discomfort and alert and oriented to person, place, and time. He demonstrates appropriate mood and affect. His skin is warm and dry. His gait is Normal.  He walks heel to toe without significant limp or instability. Back:  He stands upright. His lumbar flexion is limited with pain. His extension and lateral bending is mildly reduced with pain.    He has mild tenderness over his lumbar spine with paraspinous muscle spasm. The skin over his lumbar spine is normal without a surgical scar. Lower extremities:  Motor Exam:  5/5 hip abductors, abductors, quadriceps, hamstrings, dorsiflexors, plantar flexors, EHL testing. He has a negative straight leg raise on the left and positive straight leg raise on the right. Deep tendon reflexes: 2+ and symmetrical at the patella and Achilles. .  Sensation is intact to light touch L3 to S1 bilaterally. He has no clonus. Hip range of motion is normal, pain-free. Stinchfield test is negative. Abdomen:  Non-tender and non-distended. No rash. Imaging:  X rays AP and lateral lumbar spine obtained in the office today. X-rays show mild lumbar degenerative disc disease. Mild lumbar facet arthritis. Diagnosis:      ICD-10-CM    1. Acute right-sided low back pain with right-sided sciatica  M54.41 XR LUMBAR SPINE (2-3 VIEWS)     Ambulatory referral to Physical Therapy             Assessment/ Plan: This is a 47-year-old gentleman who has a history of chronic low back pain with symptoms waxing and waning. Symptoms became more acute 1/20/2023 after bending forward while taking a shower. Symptoms and exam consistent with probable lumbar disc protrusion. He remains neurologically intact in the lower extremities. I had an extensive discussion with Mr. Yari Gibbs and/or family regarding the natural history, etiology, and long term consequences of his condition. I have presented reasonable alternatives to the patient's proposed care, treatment, and services. Risks and benefits of the treatment options also reviewed in detail. I have outlined a treatment plan with them. He has had full opportunity to ask his questions. I have answered them all to his satisfaction. I feel that Mr. Yari Gibbs understands our discussion today.     New Medications prescribed today-continue medications including lidocaine patches, tizanidine and ibuprofen. Added gabapentin 100 mg may titrate up to 3 times daily. PT-recommend he start therapy for lumbar stabilization exercises. Further Imaging-we will consider lumbar spine MRI if symptoms fail to improve with conservative treatment. Procedures-no surgical indication at this time. Activity restrictions discussed today. No heavy lifting, pushing or pulling greater than 10 pounds. Follow up-4-6 weeks. He was instructed to call us emergently if he begins to experience bowel or bladder dysfunction, saddle anesthesia, increasing muscle weakness, or onset/ worsening leg symptoms. Renita Barron PA-C   Senior Physician Assistant   [facility]                                         Disclaimer: This note was generated with use of a verbal recognition program (DRAGON) and an attempt was made to check for errors. It is possible that there are still dictated errors within this office note. If so, please bring any significant errors to my attention for an addendum. All efforts were made to ensure that this office note is accurate.

## 2023-02-16 ENCOUNTER — TELEPHONE (OUTPATIENT)
Dept: FAMILY MEDICINE CLINIC | Age: 38
End: 2023-02-16

## 2023-02-16 NOTE — TELEPHONE ENCOUNTER
Patient stop by the office 2/15/23 and states that he need to talk to   about his wife  states that she need a referral for counseling/psychiatry per  I can't discuss your wife health with you she have to make appointment so, that I can send the referral for her. Patient  states that she called the police and his wife is in senior care and he just trying to get help for As I stated to the  I can not discuss your wife information with him. I stated to Mr. Dustin Rowe that I can get counseling for only you I proceed to give resources I once again states to  that this is for you to call only for you  gave verbal understanding .

## 2023-02-16 NOTE — TELEPHONE ENCOUNTER
----- Message from [employer] sent at 2/15/2023  4:48 PM EST -----  Subject: Message to Provider    QUESTIONS  Information for Provider? pt was routed to me to schedule a psychiatry   appointment for his wife. He was seen on 2/10 by Dr. Conner Lockwood and stated he   was given a referral for his wife Nalini Figueroa ( 2/10/1989) and Conor Ventura   gave him this number [phone] for scheduling. When he called an    gave him this number [phone] for scheduling and he got me. I am unable   to schedule psychiatry appointments so I tried transferring. Veronica Snow from   office recommended sending a message so pt receives a follow up tomorrow. ---------------------------------------------------------------------------  --------------  Beni Valdes YMAR  [phone]; OK to leave message on voicemail  ---------------------------------------------------------------------------  --------------  SCRIPT ANSWERS  Relationship to Patient?  Self

## 2023-02-27 ENCOUNTER — HOSPITAL ENCOUNTER (OUTPATIENT)
Dept: PHYSICAL THERAPY | Age: 38
Setting detail: THERAPIES SERIES
Discharge: HOME | End: 2023-02-27

## 2023-02-27 NOTE — FLOWSHEET NOTE
[address] [facility] [place]. [address] [address]  Phone: [phone]   Fax:     [phone]    Physical Therapy  Cancellation/No-show Note  Patient Name:  Christopher Grewal  :  1985   Date:  2023  Cancelled visits to date: 0  No-shows to date: 1 NP    Patient status for today's appointment patient:  []  Cancelled  []  Rescheduled appointment  [x]  No-show     Reason given by patient:  []  Patient ill  []  Conflicting appointment  []  No transportation    []  Conflict with work  [x]  No reason given  []  Other:     Comments:      Phone call information:   []  Phone call made today to patient at _ time at number provided:      []  Patient answered, conversation as follows:    []  Patient did not answer, message left as follows:  [x]  Phone call not made today.     Electronically signed by:  Washington Duarte, PT

## 2023-03-01 ENCOUNTER — APPOINTMENT (OUTPATIENT)
Dept: PHYSICAL THERAPY | Age: 38
End: 2023-03-01
Payer: COMMERCIAL

## 2023-03-07 ENCOUNTER — APPOINTMENT (OUTPATIENT)
Dept: PHYSICAL THERAPY | Age: 38
End: 2023-03-07
Payer: COMMERCIAL

## 2023-03-09 ENCOUNTER — APPOINTMENT (OUTPATIENT)
Dept: PHYSICAL THERAPY | Age: 38
End: 2023-03-09
Payer: COMMERCIAL

## 2023-03-10 NOTE — PLAN OF CARE
[address] [facility]. [place] [address]  Phone: [phone]   Fax:     [phone]                                                      Physical Therapy Certification    Dear ALBINO Cox,I think this patient may benefit from dry needling and may attempt it at some point. .  Please sign the following if you are in agreement with this. If you have any reservations or questions please contact me at [phone]  Thanks, Sincerely, Shyann Crawford PT      We had the pleasure of evaluating the following patient for physical therapy services at [facility]. A summary of our findings can be found in the initial assessment below. This includes our plan of care. If you have any questions or concerns regarding these findings, please do not hesitate to contact me at the office phone number checked above. Thank you for the referral.       Physician Signature:_______________________________Date:__________________  By signing above (or electronic signature), therapists plan is approved by physician      Patient: Marv Schilling   : 1985   MRN: [id number]  Referring Physician: ALBINO Cox      Evaluation Date: 03/15/2023      Medical Diagnosis Information:  Medical Diagnosis: Lumbago with sciatica, right side [M54.41]   Treatment Diagnosis: Lumbar pain with radicular symptoms leading to a strength and mobility deficit.                                        Insurance information: PT Insurance Information: [employer]      Precautions/ Contra-indications:   Latex Allergy:  [x]NO      []YES  Preferred Language for Healthcare:   [x]English       []other:    C-SSRS Triggered by Intake questionnaire (Past 2 wk assessment ):   [x] No, Questionnaire did not trigger screening.   [] Yes, Patient intake triggered C-SSRS Screening      [] C-SSRS Screening completed  [] PCP notified via Epic     SUBJECTIVE: Patient is a 41 y/o male with a long hx of lbp with an increase in pain on 1/20/23 after falling in the  shower. He originally injured his lb, shoulders, and neck while lifting in the gym in 2016. He has had problems off and on since. He c/o constant pain in his lb that is severe at times. He has occasional numbness in his right toes. He also has cervical pain and occasional n+t into his fingers. He wakes from pain. He denies bowel or bladder and saddle symptoms. He walks for exercise. Relevant Medical History:Additional Pertinent Hx: No PMH on file. Functional Outcome Measure: MODIFIED OSWESTRY = 19    Pain Scale: 6-8/10  Easing factors: meds  Provocative factors: bending, lifting. Type: [x]Constant   []Intermittent  []Radiating []Localized []other:     Numbness/Tingling: right toes, bilat fingers.      Occupation/School: off,     Living Status/Prior Level of Function: Independent with ADLs and IADLs,     OBJECTIVE:   Posture: -forward head , rounded shoulders, ant tlit, increased lumbar lordosis    Functional Mobility/Transfers: ind    Palpation: tight and tender in bilat piriformis, glut max, glut min, right zina very tender, PA's to right SI, L1-5,     Gait:- ambulates with pronation bilat, decreased lumbar mob and arm swing    Repeated Movements:both sore     ROM  Comments   Lumbar Flex 40    Lumbar Ext 5 Painful right     ROM LEFT RIGHT Comments   Lumbar Side Bend 20 10 Pain right   Lumbar Rotation 40 30 Pain right   Quadrant  +    Hip Flexion Wfl all  Wfl all   Hip Abd      Hip ER      Hip IR      Hip Extension      Knee Ext      Knee Flex      Hamstring Flex -25 -35    Piriformis tight tight    Jose Guadalupe test  tight tight             Myotomes/Strength Normal Abnormal Comments   [x]ALL NORMAL      Hip Abd      Hip Ext      Hip flexion (L1-L2 femoral) [] []    Knee extension (L2-L4 femoral) [] []    Knee flexion (S1 sciatic)      Dorsiflexion (L4-L5 deep peroneal) [] []    Great Toe Ext (L5 deep peroneal nerve) [] [x]    Ankle Eversion (S1-S2 super peroneal) [] []    Ankle PF(S1-S2 tibial) [] []    Multifidus [] [x] right   Transverse Ab [] [] 2     Dermatomes Normal Abnormal Comments   [x]ALL NORMAL            inguinal area (L1)  [] []    anterior mid-thigh (L2) [] []    distal ant thigh/med knee (L3) [] []    medial lower leg and foot (L4) [] []    lateral lower leg and foot (L5) [] [x]    posterior calf (S1) [] []    medial calcaneus (S2) [] []      Reflexes Normal Abnormal Comments   [x]ALL NORMAL            S1-2 Seated achilles [] []    S1-2 Prone knee bend [] []    L3-4 Patellar tendon [] []    C5-6 Biceps [] []    C6 Brachioradialis [] []    C7-8 Triceps [] []    Clonus [] []    Babinski [] []    Multifidis lift + right     Copeland's [x] []      Joint mobility:    []Normal    [x]Hypo   []Hyp    Orthopedic Special Tests: Hip clearing, Fabers- neg   Neural dynamic tension testing Normal Abnormal Comments   Slump Test  - Degree of knee flexion:  [] [x] right   SLR  [] []    0-30 [x] []    30-70 [x] []    Femoral nerve (L2-4) [] []       Normal Abnormal N/A Comments   Fwd Bend-aberrant or innominate mvmt) [] [] []    Trendelenburg [] [] []    Kemps/Quadrant [] [] []    [place] [] [] []    HARRISON/Bry [x] [] []    Hip scour [x] [] []    Supine to sit [] [] []    Prone knee bend [] [] []           Hip thrust [x] [] []    SI distraction/compression [] [] []    Sacral Spring/thrust [] [] []               [x] Patient history, allergies, meds reviewed. Medical chart reviewed. See intake form. Review Of Systems (ROS):  [x]Performed Review of systems (Integumentary, CardioPulmonary, Neurological) by intake and observation. Intake form has been scanned into medical record. Patient has been instructed to contact their primary care physician regarding ROS issues if not already being addressed at this time.     Co-morbidities/Complexities (which will affect course of rehabilitation):   []None           Arthritic conditions   []Rheumatoid arthritis (M05.9)  []Osteoarthritis (M19.91)   Cardiovascular conditions   []Hypertension (I10)  []Hyperlipidemia (E78.5)  []Angina pectoris (I20)  []Atherosclerosis (I70)  []CVA Musculoskeletal conditions   []Disc pathology   []Congenital spine pathologies   []Prior surgical intervention  []Osteoporosis (M81.8)  []Osteopenia (M85.8)   Endocrine conditions   []Hypothyroid (E03.9)  []Hyperthyroid Gastrointestinal conditions   []Constipation (I19.72)   Metabolic conditions   []Morbid obesity (E66.01)  []Diabetes type 1(E10.65) or 2 (E11.65)   []Neuropathy (G60.9)     Pulmonary conditions   []Asthma (J45)  []Coughing   []COPD (J44.9)   Psychological Disorders  []Anxiety (F41.9)  []Depression (F32.9)   []Other:   [x]Other:   neuropathy, vertigo        Barriers to/and or personal factors that will affect rehab potential:              []Age  []Sex    []Smoker              []Motivation/Lack of Motivation                        []Co-Morbidities              []Cognitive Function, education/learning barriers              []Environmental, home barriers              []profession/work barriers  []past PT/medical experience  []other:  Justification:     Falls Risk Assessment (30 days):   [x] Falls Risk assessed and no intervention required.   [] Falls Risk assessed and Patient requires intervention due to being higher risk   TUG score (>12s at risk):     [] Falls education provided, including:         ASSESSMENT:   Functional Impairments:     [x]Noted lumbar/proximal hip hypomobility   []Noted lumbosacral and/or generalized hypermobility   []Decreased Lumbosacral/hip/LE functional ROM   []Decreased core/proximal hip strength and neuromuscular control    []Decreased LE functional strength    []Abnormal reflexes/sensation/myotomal/dermatomal deficits  [x]Reduced balance/proprioceptive control    []other:      Functional Activity Limitations (from functional questionnaire and intake)   [x]Reduced ability to tolerate prolonged functional positions   [x]Reduced ability or difficulty with changes of positions or transfers between positions   [x]Reduced ability to maintain good posture and demonstrate good body mechanics with sitting, bending, and lifting   [x]Reduced ability to sleep   [x] Reduced ability or tolerance with driving and/or computer work   [x]Reduced ability to perform lifting, reaching, carrying tasks   [x]Reduced ability to squat   [x]Reduced ability to forward bend   [x]Reduced ability to ambulate prolonged functional periods/distances/surfaces   [x]Reduced ability to ascend/descend stairs   []other:       Participation Restrictions   [x]Reduced participation in self care activities   [x]Reduced participation in home management activities   [x]Reduced participation in work activities   [x]Reduced participation in social activities. [x]Reduced participation in sport/recreational activities. Classification:   []Signs/symptoms consistent with Lumbar instability/stabilization subgroup. []Signs/symptoms consistent with Lumbar mobilization/manipulation subgroup, myotomes and dermatomes intact. Meets manipulation criteria. [x]Signs/symptoms consistent with Lumbar direction specific/centralization subgroup   [x]Signs/symptoms consistent with Lumbar traction subgroup       []Signs/symptoms consistent with lumbar facet dysfunction   []Signs/symptoms consistent with lumbar stenosis type dysfunction   []Signs/symptoms consistent with nerve root involvement including myotome & dermatome dysfunction   []Signs/symptoms consistent with post-surgical status including: decreased ROM, strength and function.    [x]signs/symptoms consistent with pathology which may benefit from Dry needling     []other:      Prognosis/Rehab Potential:      []Excellent   [x]Good    []Fair   []Poor    Tolerance of evaluation/treatment:    []Excellent   [x]Good    []Fair   []Poor Physical Therapy Evaluation Complexity Justification  [x] A history of present problem with:  [] no personal factors and/or comorbidities that impact the plan of care;  []1-2 personal factors and/or comorbidities that impact the plan of care  []3 personal factors and/or comorbidities that impact the plan of care  [x] An examination of body systems using standardized tests and measures addressing any of the following: body structures and functions (impairments), activity limitations, and/or participation restrictions;:  [] a total of 1-2 or more elements   [] a total of 3 or more elements   [] a total of 4 or more elements   [x] A clinical presentation with:  [] stable and/or uncomplicated characteristics   [x] evolving clinical presentation with changing characteristics  [] unstable and unpredictable characteristics;   [x] Clinical decision making of [x] low, [] moderate, [] high complexity using standardized patient assessment instrument and/or measurable assessment of functional outcome. [x] EVAL (LOW) 83274 (typically 20 minutes face-to-face)  [] EVAL (MOD) 54284 (typically 30 minutes face-to-face)  [] EVAL (HIGH) 66017 (typically 45 minutes face-to-face)  [] RE-EVAL     PLAN: Begin PT focusing on: proximal hip mobilizations, LB mobs, LB core activation, proximal hip activation, and HEP    Frequency/Duration:  2 days per week for 8 Weeks:  Interventions:  [x]  Therapeutic exercise including: strength training, ROM, for LE, Glutes and core   [x]  NMR activation and proprioception for glutes , LE and Core   [x]  Manual therapy as indicated for Hip complex, LE and spine to include: Dry Needling/IASTM, STM, PROM, Gr I-IV mobilizations, manipulation. [x]  Modalities as needed that may include: thermal agents, E-stim, Biofeedback, US, iontophoresis as indicated  [x]  Patient education on joint protection, postural re-education, activity modification, progression of HEP.     HEP instruction: (see scanned forms)    GOALS:  Patient stated goal: \" I want to have less pain and increased function  [] Progressing: [] Met: [] Not Met: [] Adjusted  Therapist goals for Patient:   Short Term Goals: To be achieved in: 2 weeks  1. Independent in HEP and progression per patient tolerance, in order to prevent re-injury. [] Progressing: [] Met: [] Not Met: [] Adjusted  2. Patient will have a decrease in pain to facilitate improvement in movement, function, and ADLs as indicated by Functional Deficits. [] Progressing: [] Met: [] Not Met: [] Adjusted    Long Term Goals: To be achieved in: 8 weeks  1. Increase FOTO functional outcome score from 19 to 15  to assist with reaching prior level of function. [] Progressing: [] Met: [] Not Met: [] Adjusted  2. Patient will demonstrate increased AROM to WNL, good LS mobility, good hip ROM to allow for proper joint functioning as indicated by patients Functional Deficits. [] Progressing: [] Met: [] Not Met: [] Adjusted  3. Patient will demonstrate an increase in Strength to good proximal hip and core activation to allow for proper functional mobility as indicated by patients Functional Deficits. [] Progressing: [] Met: [] Not Met: [] Adjusted  4. 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 [] Progressing: [] Met: [] Not Met: [] Adjusted  5. (patient specific functional goal)    [] Progressing: [] Met: [] Not Met: [] Adjusted     Electronically signed by:  Cheyanne Cardozo PT        Note: If patient does not return for scheduled/recommended follow up visits, this note will serve as a discharge from care along with the most recent update on progress.

## 2023-03-15 ENCOUNTER — HOSPITAL ENCOUNTER (OUTPATIENT)
Dept: PHYSICAL THERAPY | Age: 38
Setting detail: THERAPIES SERIES
Discharge: HOME | End: 2023-03-15
Payer: COMMERCIAL

## 2023-03-15 ENCOUNTER — TELEPHONE (OUTPATIENT)
Dept: ORTHOPEDIC SURGERY | Age: 38
End: 2023-03-15

## 2023-03-15 PROCEDURE — 97110 THERAPEUTIC EXERCISES: CPT

## 2023-03-15 PROCEDURE — 97530 THERAPEUTIC ACTIVITIES: CPT

## 2023-03-15 PROCEDURE — 97161 PT EVAL LOW COMPLEX 20 MIN: CPT

## 2023-03-15 NOTE — TELEPHONE ENCOUNTER
Pt stopped at the office asking if we can give him a note says he could be off work till he finished his last visit with PT on 4.28.23. please contact him back for any question

## 2023-03-15 NOTE — FLOWSHEET NOTE
[address] [facility]. [place] [address]  Phone: [phone]   Fax:     [phone]  Date:  03/15/2023    Patient Name:  Lissett Boudreaux    :  1985  MRN: [id number]    Pertinent Medical History: Additional Pertinent Hx: No PMH on file. Medical/Treatment Diagnosis Information:  Medical Diagnosis: Lumbago with sciatica, right side [M54.41]  Treatment Diagnosis: Lumbar pain with radicular symptoms leading to a strength and mobility deficit. Insurance/Certification information:  PT Insurance Information: [facility]  Physician Information:  ALBINO Cardoza  Plan of care signed (Y/N): routed    Date of Patient follow up with Physician:      Progress Report: []  Yes  [x]  No     Date Range for reporting period:  Beginning: 3/15/2023  Ending:     Progress report due (10 Rx/or 30 days whichever is less): 50     Recertification due (POC duration/ or 90 days whichever is less):      Visit # Insurance/POC Allowable Auth Needed     []Yes    []No       Functional Scale:       Date Assessed: at eval  Test:Oswestry- 19  Score:     Pain level:  6-8/10     History of Injury:Patient is a 41 y/o male with a long hx of lbp with an increase in pain on 23 after falling in the  shower. He originally injured his lb, shoulders, and neck while lifting in the gym in 2016. He has had problems off and on since. He c/o constant pain in his lb that is severe at times. He has occasional numbness in his right toes. He also has cervical pain and occasional n+t into his fingers. He wakes from pain. He denies bowel or bladder and saddle symptoms. He walks for exercise.      SUBJECTIVE:  Pt states, \" I have been in pain since 2016 \"    OBJECTIVE:      ROM   Comments   Lumbar Flex 40     Lumbar Ext 5 Painful right      ROM LEFT RIGHT Comments   Lumbar Side Bend 20 10 Pain right   Lumbar Rotation 40 30 Pain right   Quadrant   +     Hip Flexion Wfl all   Wfl all   Hip Abd         Hip ER         Hip IR         Hip Extension         Knee Ext         Knee Flex         Hamstring Flex -25 -35     Piriformis tight tight     Jose Guadalupe test  tight tight      RESTRICTIONS/PRECAUTIONS:     Exercises/Interventions:     Therapeutic Ex (20458)   Min: Resistance/Reps Notes/Cues   Nu step     dktc     ocmichelleer     clams     Sl abd                         Manual Intervention (86428) Min: Gr 3 pa's to ls. Sacral distraction              NMR re-education (94710)   Min:     Mf Activation- re-ed     TrA Re-ed activation     Glute Max re-ed activation          Therapeutic Activity (42606) Min:15 Discussed mechanism of injury, anatomy, physiology, biomechanics, sleeping positions,  and strategies to accelerate the healing process. Answered all of patients questions regarding injury. Gave necessary information to get any equipment needed. Modalities  Min:             Other Therapeutic Activities:  Pt was educated on PT POC, Diagnosis, Prognosis, pathomechanics as well as frequency and duration of scheduling future physical therapy appointments. Time was also taken on this day to answer all patient questions and participation in PT. Reviewed appointment policy in detail with patient and patient verbalized understanding. Home Exercise Program: Patient demonstrated proper technique, good tolerance,  and was given written instructions for the above exercises  Access Code: [email or web address]  URL: [email or web address]  Date: 03/15/2023  Prepared by: Maureen Brown    Exercises  Supine Bridge - 1 x daily - 7 x weekly - 3 sets - 10 reps  Supine Lower Trunk Rotation - 1 x daily - 7 x weekly - 3 sets - 10 reps  Modified Sidelying Thoracic Rotation - 1 x daily - 7 x weekly - 3 sets - 10 reps  Clamshell - 1 x daily - 7 x weekly - 3 sets - 10 reps  Seated Long Arc Quad - 1 x daily - 7 x weekly - 3 sets - 10 reps      Therapeutic Exercise and NMR EXR  [] (99673) Provided verbal/tactile cueing for activities related to strengthening, flexibility, endurance, ROM  for improvements in proximal hip and core control with self care, mobility, lifting and ambulation.  [] (24042) Provided verbal/tactile cueing for activities related to improving balance, coordination, kinesthetic sense, posture, motor skill, proprioception  to assist with core control in self care, mobility, lifting, and ambulation. Therapeutic Activities:    [] (76640 or 51330) Provided verbal/tactile cueing for activities related to improving balance, coordination, kinesthetic sense, posture, motor skill, proprioception and motor activation to allow for proper function  with self care and ADLs  [] (88891) Provided training and instruction to the patient for proper core and proximal hip recruitment and positioning with ambulation re-education     Home Exercise Program:    [] (73497) Reviewed/Progressed HEP activities related to strengthening, flexibility, endurance, ROM of core, proximal hip and LE for functional self-care, mobility, lifting and ambulation   [] (13094) Reviewed/Progressed HEP activities related to improving balance, coordination, kinesthetic sense, posture, motor skill, proprioception of core, proximal hip and LE for self care, mobility, lifting, and ambulation      Manual Treatments:  PROM / STM / Oscillations-Mobs:  G-I, II, III, IV (PA's, Inf., Post.)  [] (67193) Provided manual therapy to mobilize proximal hip and LS spine soft tissue/joints for the purpose of modulating pain, promoting relaxation,  increasing ROM, reducing/eliminating soft tissue swelling/inflammation/restriction, improving soft tissue extensibility and allowing for proper ROM for normal function with self care, mobility, lifting and ambulation.      Approval Dates:  CPT Code Units Approved Units Used  Date Updated:                     Charges:  Timed Code Treatment Minutes: 30   Total Treatment Minutes: 50 [x] EVAL (LOW) 19771 (typically 20 minutes face-to-face)  [] EVAL (MOD) 60478 (typically 30 minutes face-to-face)  [] EVAL (HIGH) 73145 (typically 45 minutes face-to-face)  [] RE-EVAL     [x] BK(92803) x 1    [] Dry needle 1 or 2 Muscles (96983)  [] NMR (31247) x     [] Dry needle 3+ Muscles (29888)  [] Manual (46452) x     [] Ultrasound (20594) x  [x] TA (71419) x  1   [] Mech Traction (61488)  [] ES(attended) (35571)     [] ES (un) (79127):   [] Vasopump (53006) [] Ionto (02763)   [] Other:    If [facility] Please Indicate Time In/Out  CPT Code Time in Time out                                   Approval Dates:  CPT Code Units Approved Units Used  Date Updated:                     GOALS:GOALS:  Patient stated goal: \" I want to have less pain and increased function  [] Progressing: [] Met: [] Not Met: [] Adjusted  Therapist goals for Patient:   Short Term Goals: To be achieved in: 2 weeks  1. Independent in HEP and progression per patient tolerance, in order to prevent re-injury. [] Progressing: [] Met: [] Not Met: [] Adjusted  2. Patient will have a decrease in pain to facilitate improvement in movement, function, and ADLs as indicated by Functional Deficits. [] Progressing: [] Met: [] Not Met: [] Adjusted     Long Term Goals: To be achieved in: 8 weeks  1. Increase FOTO functional outcome score from 19 to 15  to assist with reaching prior level of function. [] Progressing: [] Met: [] Not Met: [] Adjusted  2. Patient will demonstrate increased AROM to WNL, good LS mobility, good hip ROM to allow for proper joint functioning as indicated by patients Functional Deficits. [] Progressing: [] Met: [] Not Met: [] Adjusted  3. Patient will demonstrate an increase in Strength to good proximal hip and core activation to allow for proper functional mobility as indicated by patients Functional Deficits. [] Progressing: [] Met: [] Not Met: [] Adjusted  4.  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 [] Progressing: [] Met: [] Not Met: [] Adjusted    ASSESSMENT:  See eval    Treatment/Activity Tolerance:  [x] Patient tolerated treatment well [] Patient limited by fatique  [] Patient limited by pain  [] Patient limited by other medical complications  [] Other:     Overall Progression Towards Functional goals/ Treatment Progress Update:  [] Patient is progressing as expected towards functional goals listed. [] Progression is slowed due to complexities/Impairments listed. [] Progression has been slowed due to co-morbidities. [x] Plan just implemented, too soon to assess goals progression <30days   [] Goals require adjustment due to lack of progress  [] Patient is not progressing as expected and requires additional follow up with physician  [] Other:    Prognosis for POC: [x] Good [] Fair  [] Poor    Patient requires continued skilled intervention: [x] Yes  [] No        PLAN: Discussed mechanism of injury, anatomy, physiology, biomechanics, sleeping positions,  and strategies to accelerate the healing process. Answered all of patients questions regarding injury. Gave necessary information to get any equipment needed. ,  may dn.      [] Continue per plan of care [] Alter current plan (see comments)  [x] Plan of care initiated [] Hold pending MD visit [] Discharge    Electronically signed by: Leela Metcalf PT    Note: If patient does not return for scheduled/recommended follow up visits, this note will serve as a discharge from care along with the most recent update on progress.

## 2023-03-16 ENCOUNTER — OFFICE VISIT (OUTPATIENT)
Dept: PRIMARY CARE CLINIC | Age: 38
End: 2023-03-16
Payer: COMMERCIAL

## 2023-03-16 VITALS
BODY MASS INDEX: 28.03 KG/M2 | DIASTOLIC BLOOD PRESSURE: 88 MMHG | SYSTOLIC BLOOD PRESSURE: 140 MMHG | TEMPERATURE: 97.7 F | HEART RATE: 76 BPM | OXYGEN SATURATION: 98 % | WEIGHT: 189.8 LBS

## 2023-03-16 DIAGNOSIS — G89.29 CHRONIC BILATERAL LOW BACK PAIN WITHOUT SCIATICA: Primary | Chronic | ICD-10-CM

## 2023-03-16 DIAGNOSIS — M54.50 CHRONIC BILATERAL LOW BACK PAIN WITHOUT SCIATICA: Primary | Chronic | ICD-10-CM

## 2023-03-16 PROCEDURE — G8484 FLU IMMUNIZE NO ADMIN: HCPCS | Performed by: FAMILY MEDICINE

## 2023-03-16 PROCEDURE — 1036F TOBACCO NON-USER: CPT | Performed by: FAMILY MEDICINE

## 2023-03-16 PROCEDURE — G8427 DOCREV CUR MEDS BY ELIG CLIN: HCPCS | Performed by: FAMILY MEDICINE

## 2023-03-16 PROCEDURE — G8419 CALC BMI OUT NRM PARAM NOF/U: HCPCS | Performed by: FAMILY MEDICINE

## 2023-03-16 PROCEDURE — 99214 OFFICE O/P EST MOD 30 MIN: CPT | Performed by: FAMILY MEDICINE

## 2023-03-16 RX ORDER — TIZANIDINE 4 MG/1
4 TABLET ORAL 4 TIMES DAILY PRN
Qty: 60 TABLET | Refills: 0 | Status: SHIPPED | OUTPATIENT
Start: 2023-03-16

## 2023-03-16 ASSESSMENT — ENCOUNTER SYMPTOMS
CONSTIPATION: 0
DIARRHEA: 0
NAUSEA: 0
VOMITING: 0
COUGH: 0
BACK PAIN: 1
SHORTNESS OF BREATH: 0

## 2023-03-16 NOTE — PROGRESS NOTES
Vianca Gabriel (:  1985) is a 42 y.o. male,Established patient, here for evaluation of the following chief complaint(s):  Back Pain    Reviewed ortho notes as well as PT notes    SUBJECTIVE:  3. --   Only recently started with physical therapy due to family strain and issues with his wife. May need MRI if he does not improve after finishing physical therapy. Will follow up with him in first week of May after his sessions to evaluate. 2.10.23: Follow up on lumbar spine pain  Still having pain, will refer to ortho    Having shooting pain down his right leg, worse with hip flexion and with standing. Feels better with laying flat or sitting down with his legs extended. 23 -- new patient    Was in the shower this morning, today, feels like he strained his back after he bend down to grab something. He has had low back pain in the past which took approx 1 mo to heal before. Has strain of his back in 2016  and had to walk hunched over for 4 days. Works as a long distance  since 1869, and sits for a long time. Discussed treatment, PT. Pt requesting time off from his current job, this is appropriate as he will have been working as  with long periods of sitting without moving    Pt also admits that sometimes he has some pain in his testicles. He feels like at times they are inflamed or swollen. He denies any trauma to them. States that after he has laid in bed they tend to feel better. Denies fever or discharge from his penis, no abscesses noted. Deferred  exam -- will order US for further evaluation. Back Pain  Pertinent negatives include no fever. Shoulder Pain   Pertinent negatives include no fever. I have reviewed the chart notes available from myself and other providers.  I have reviewed and addressed all active problems and created or updated the problems list in detail, as needed    I have extensively reviewed and reconciled the medication list, discontinued medications not taking or no longer appropriate, and updated the active meds list    OBJECTIVE:  Review of Systems   Constitutional:  Negative for chills and fever. Respiratory:  Negative for cough and shortness of breath. Cardiovascular:  Negative for leg swelling. Gastrointestinal:  Negative for constipation, diarrhea, nausea and vomiting. Endocrine: Negative for polyuria. Genitourinary:  Negative for frequency. Musculoskeletal:  Positive for back pain. Skin:  Negative for rash. Vitals:    03/16/23 1331   BP: (!) 140/88   Site: Right Upper Arm   Position: Sitting   Cuff Size: Medium Adult   Pulse: 76   Temp: 97.7 °F (36.5 °C)   SpO2: 98%   Weight: 189 lb 12.8 oz (86.1 kg)      Body mass index is 28.03 kg/m². Physical Exam  Constitutional:       Appearance: Normal appearance. Cardiovascular:      Rate and Rhythm: Normal rate and regular rhythm. Pulses: Normal pulses. Heart sounds: Normal heart sounds. Pulmonary:      Effort: Pulmonary effort is normal.      Breath sounds: Normal breath sounds. Genitourinary:     Comments: deferred  Musculoskeletal:      Right lower leg: No edema. Left lower leg: No edema. Comments: Ttp to areas of lumbar spine L2-L5, as well as associated paraspinal musculature   Neurological:      General: No focal deficit present. Mental Status: He is alert. Mental status is at baseline.          No results found for: LABA1C  Lab Results   Component Value Date    WBC 10.4 01/12/2023    HGB 14.9 01/12/2023    HCT 44.9 01/12/2023    MCV 95.7 01/12/2023     01/12/2023      Lab Results   Component Value Date/Time     01/12/2023 08:00 PM    K 3.9 01/12/2023 08:00 PM     01/12/2023 08:00 PM    CO2 23 01/12/2023 08:00 PM    BUN 24 01/12/2023 08:00 PM    CREATININE 1.0 01/12/2023 08:00 PM    GLUCOSE 96 01/12/2023 08:00 PM    CALCIUM 9.3 01/12/2023 08:00 PM       Lab Results   Component Value Date    CHOL 224 (H) 12/20/2022     Lab Results   Component Value Date    TRIG 244 (H) 12/20/2022     Lab Results   Component Value Date    HDL 40 12/20/2022     Lab Results   Component Value Date    LDLCALC 135 (H) 12/20/2022     Lab Results   Component Value Date    LABVLDL 49 12/20/2022     No results found for: CHOLHDLRATIO     The ASCVD Risk score (Elizabeth OVERTON, et al., 2019) failed to calculate for the following reasons: The 2019 ASCVD risk score is only valid for ages 36 to 78     Patient received counseling and, if relevant, printed instructions for all symptoms listed in CC and HPI, as well as for all diagnoses brought onto today's visit note below. Typical counseling includes, but is not limited to, non-pharmacologic measures to manage listed symptoms and conditions; appropriate use, risks and benefits for all prescribed medications; potential interactions between medications both prescribed and OTC; diet; exercise; healthy behaviors; and goalsetting to improve health. Patient or responsible party was involved in shared decision making and had opportunity to have all questions answered. Except as noted below, all chronic problems have been reviewed and are stable to continue medications or other therapy as previously documented in the patient's chart, with changes per orders or documentation below:    1. Chronic bilateral low back pain without sciatica  Comments:  pain still present. continue muscle relaxant and PT sessions  Orders:  -     tiZANidine (ZANAFLEX) 4 MG tablet; Take 1 tablet by mouth 4 times daily as needed (spasm), Disp-60 tablet, R-0Normal        Problem List          Medium    Chronic bilateral low back pain without sciatica - Primary    Relevant Medications    ibuprofen (ADVIL;MOTRIN) 600 MG tablet    gabapentin (NEURONTIN) 100 MG capsule    tiZANidine (ZANAFLEX) 4 MG tablet   No orders of the defined types were placed in this encounter.           Return in about 7 weeks (around 5/1/2023) for follow up after finishing PT. Dr. Olga Sánchez and [facility]        Usual doctor's hours are:     Monday 7:30 am to 6:00 pm           Tuesday  7:30 am to 5:00 pm                                               Wednesday 7:30 am to 5:00 pm                                               Thursday 7:30 am to 5:00 pm                                               Friday 7:30 am to 4:00 pm           Saturdays, Sundays, and after hours: E-Visits are available    We observe most federal holidays and Good Friday. We ask that you only contact the office one time per issue or question, and please allow one full business day for a call back. Calling us back multiple times keeps us from being able to complete the work efficiently for you and our other patients. For medication renewals, please call your pharmacist to contact us, and be sure to allow at least 3 business days for processing before you need to  your medication. If you are sick or need an appointment that hasn't been planned, same day appointments are available every day the office is open: Monday, Tuesday, Wednesday, Thursday, and Friday. Call during office hours to schedule, even if it may not be with your regular physician. You may also call the office after 8 am on office days if you need to be seen from an issue the night before. During hours when the office is not normally open, your call will go to the messaging service which cannot provide any service other than paging the doctor. No prescriptions or other nonurgent matters will be handled and no voicemail is available, so please call back during office hours for these matters.        Electronically signed by Amanda Weinberg DO on 3/16/2023 at 1:55 PM.

## 2023-03-16 NOTE — TELEPHONE ENCOUNTER
I called patient. Ana Luisa Hurst is not able to take him out of work to attend therapy 1-2 x a week. He offered him intermittent leave for that but not out totally.     He declined

## 2023-03-22 ENCOUNTER — HOSPITAL ENCOUNTER (OUTPATIENT)
Dept: PHYSICAL THERAPY | Age: 38
Setting detail: THERAPIES SERIES
Discharge: HOME | End: 2023-03-22
Payer: COMMERCIAL

## 2023-03-22 PROCEDURE — 97112 NEUROMUSCULAR REEDUCATION: CPT

## 2023-03-22 PROCEDURE — 97140 MANUAL THERAPY 1/> REGIONS: CPT

## 2023-03-22 NOTE — FLOWSHEET NOTE
[address] [facility]  Phone: [phone]   Fax:     [phone]  Date:  2023    Patient Name:  Mohini Galeano    :  1985  MRN: [id number]    Pertinent Medical History: Additional Pertinent Hx: No PMH on file. Medical/Treatment Diagnosis Information:  Medical Diagnosis: Lumbago with sciatica, right side [M54.41]  Treatment Diagnosis: Lumbar pain with radicular symptoms leading to a strength and mobility deficit. Insurance/Certification information:  PT Insurance Information: [facility]  Physician Information:  ALBINO Draper  Plan of care signed (Y/N): routed    Date of Patient follow up with Physician:      Progress Report: []  Yes  [x]  No     Date Range for reporting period:  Beginning: 3/22/2023  Ending:     Progress report due (10 Rx/or 30 days whichever is less):      Recertification due (POC duration/ or 90 days whichever is less):      Visit # Insurance/POC Allowable Auth Needed    by 23 []Yes    []No       Functional Scale:       Date Assessed: at eval  Test:Oswestry- 19  Score:     Pain level:  710     History of Injury:Patient is a 41 y/o male with a long hx of lbp with an increase in pain on 23 after falling in the  shower. He originally injured his lb, shoulders, and neck while lifting in the gym in 2016. He has had problems off and on since. He c/o constant pain in his lb that is severe at times. He has occasional numbness in his right toes. He also has cervical pain and occasional n+t into his fingers. He wakes from pain. He denies bowel or bladder and saddle symptoms. He walks for exercise.      SUBJECTIVE:  Pt states, \" I have been in pain since 2016 \"    OBJECTIVE:      ROM   Comments   Lumbar Flex 40     Lumbar Ext 5 Painful right      ROM LEFT RIGHT Comments   Lumbar Side Bend 20 10 Pain right   Lumbar Rotation 40

## 2023-03-31 ENCOUNTER — HOSPITAL ENCOUNTER (OUTPATIENT)
Dept: PHYSICAL THERAPY | Age: 38
Setting detail: THERAPIES SERIES
Discharge: HOME | End: 2023-03-31
Payer: COMMERCIAL

## 2023-03-31 PROCEDURE — 97140 MANUAL THERAPY 1/> REGIONS: CPT

## 2023-03-31 PROCEDURE — 97110 THERAPEUTIC EXERCISES: CPT

## 2023-03-31 NOTE — FLOWSHEET NOTE
[address] [facility]. [place] [address] [address]  Phone: [phone]   Fax:     [phone]  Date:  2023    Patient Name:  Migue Patel    :  1985  MRN: [id number]    Pertinent Medical History: Additional Pertinent Hx: No PMH on file. Medical/Treatment Diagnosis Information:  Medical Diagnosis: Lumbago with sciatica, right side [M54.41]  Treatment Diagnosis: Lumbar pain with radicular symptoms leading to a strength and mobility deficit. Insurance/Certification information:  PT Insurance Information: [facility]  Physician Information:  ALBINO Buitrago  Plan of care signed (Y/N): routed    Date of Patient follow up with Physician:      Progress Report: []  Yes  [x]  No     Date Range for reporting period:  Beginning: 3/31/2023  Ending:     Progress report due (10 Rx/or 30 days whichever is less): 5/10/99     Recertification due (POC duration/ or 90 days whichever is less):      Visit # Insurance/POC Allowable Auth Needed   3 / 50 by 23 []Yes    []No       Functional Scale:       Date Assessed: at eval  Test:Oswestry- 19  Score:     Pain level:  7/10     History of Injury:Patient is a 41 y/o male with a long hx of lbp with an increase in pain on 23 after falling in the  shower. He originally injured his lb, shoulders, and neck while lifting in the gym in . He has had problems off and on since. He c/o constant pain in his lb that is severe at times. He has occasional numbness in his right toes. He also has cervical pain and occasional n+t into his fingers. He wakes from pain. He denies bowel or bladder and saddle symptoms. He walks for exercise.      SUBJECTIVE:  Pt states, \" I have been in pain since  \"  3/31/23  Pt states, \" I can stand for an hour now \"    OBJECTIVE:      ROM   Comments   Lumbar Flex 40     Lumbar Ext 5 Painful right      ROM LEFT RIGHT Comments

## 2023-04-18 ENCOUNTER — HOSPITAL ENCOUNTER (OUTPATIENT)
Dept: PHYSICAL THERAPY | Age: 38
Setting detail: THERAPIES SERIES
Discharge: HOME | End: 2023-04-18
Payer: COMMERCIAL

## 2023-04-18 PROCEDURE — 97110 THERAPEUTIC EXERCISES: CPT

## 2023-04-18 PROCEDURE — 97140 MANUAL THERAPY 1/> REGIONS: CPT

## 2023-04-18 NOTE — FLOWSHEET NOTE
Painful right      ROM LEFT RIGHT Comments   Lumbar Side Bend 20 10 Pain right   Lumbar Rotation 40 30 Pain right   Quadrant   +     Hip Flexion Wfl all   Wfl all   Hip Abd         Hip ER         Hip IR         Hip Extension         Knee Ext         Knee Flex         Hamstring Flex -25 -35     Piriformis tight tight     Jose Guadalupe test  tight tight      RESTRICTIONS/PRECAUTIONS:     Exercises/Interventions:     Therapeutic Ex (00052)   Min: Resistance/Reps Notes/Cues   Nu step     dktc 10 x 10    ockler     clams     Sl abd                         Manual Intervention (83209) Min:30 Gr 3 pa's to ls. Sacral distraction, percussion hammer to thor/lumbar, unilateral pa's from T5-S1, sl gr 3 rotation mobs              NMR re-education (69544)   Min:     pilaf     Walk outs     Bilat ext  Blue x 30    Open book sl X 2 min ea    All 4's thoracic rotation X 2 min ea    Cat and camel X 2 min    Thread the needle 30 x 3 ea    Thoracic rot on wall X 2 min    1 arm rows Black x 30 ea    lawnmower     Dead lift                         Therapeutic Activity (60212) Min: Discussed mechanism of injury, anatomy, physiology, biomechanics, sleeping positions,  and strategies to accelerate the healing process. Answered all of patients questions regarding injury. Gave necessary information to get any equipment needed. Modalities  Min:             Other Therapeutic Activities:  Pt was educated on PT POC, Diagnosis, Prognosis, pathomechanics as well as frequency and duration of scheduling future physical therapy appointments. Time was also taken on this day to answer all patient questions and participation in PT. Reviewed appointment policy in detail with patient and patient verbalized understanding. Home Exercise Program: Patient demonstrated proper technique, good tolerance,  and was given written instructions for the above exercises  Access Code: [email or web address]  URL: [email or web address]  Date: 03/15/2023  Prepared

## 2023-04-21 ENCOUNTER — HOSPITAL ENCOUNTER (OUTPATIENT)
Dept: PHYSICAL THERAPY | Age: 38
Setting detail: THERAPIES SERIES
Discharge: HOME | End: 2023-04-21
Payer: COMMERCIAL

## 2023-04-28 ENCOUNTER — HOSPITAL ENCOUNTER (OUTPATIENT)
Dept: PHYSICAL THERAPY | Age: 38
Setting detail: THERAPIES SERIES
Discharge: HOME | End: 2023-04-28
Payer: COMMERCIAL

## 2023-04-28 PROCEDURE — 97140 MANUAL THERAPY 1/> REGIONS: CPT

## 2023-04-28 PROCEDURE — 97110 THERAPEUTIC EXERCISES: CPT

## 2023-04-28 NOTE — FLOWSHEET NOTE
allow for proper function  with self care and ADLs  [] (76464) Provided training and instruction to the patient for proper core and proximal hip recruitment and positioning with ambulation re-education     Home Exercise Program:    [] (05771) Reviewed/Progressed HEP activities related to strengthening, flexibility, endurance, ROM of core, proximal hip and LE for functional self-care, mobility, lifting and ambulation   [] (59079) Reviewed/Progressed HEP activities related to improving balance, coordination, kinesthetic sense, posture, motor skill, proprioception of core, proximal hip and LE for self care, mobility, lifting, and ambulation      Manual Treatments:  PROM / STM / Oscillations-Mobs:  G-I, II, III, IV (PA's, Inf., Post.)  [] (95178) Provided manual therapy to mobilize proximal hip and LS spine soft tissue/joints for the purpose of modulating pain, promoting relaxation,  increasing ROM, reducing/eliminating soft tissue swelling/inflammation/restriction, improving soft tissue extensibility and allowing for proper ROM for normal function with self care, mobility, lifting and ambulation.      Approval Dates:  CPT Code Units Approved Units Used  Date Updated:                     Charges:  Timed Code Treatment Minutes: 45   Total Treatment Minutes: 50     [] EVAL (LOW) 42024 (typically 20 minutes face-to-face)  [] EVAL (MOD) 19782 (typically 30 minutes face-to-face)  [] EVAL (HIGH) 88888 (typically 45 minutes face-to-face)  [] RE-EVAL     [x] OY(72472) x 1    [] Dry needle 1 or 2 Muscles (55888)  [] NMR (95360) x     [] Dry needle 3+ Muscles (63665)  [x] Manual (80341) x  2   [] Ultrasound (25295) x  [] TA (48861) x    [] Mech Traction (44637)  [] ES(attended) (37336)     [] ES (un) (76011):   [] Vasopump (51601) [] Ionto (99770)   [] Other:    If [facility] Please Indicate Time In/Out  CPT Code Time in Time out                                   Approval Dates:  CPT Code Units Approved Units Used  Date Updated:

## 2023-05-04 ENCOUNTER — OFFICE VISIT (OUTPATIENT)
Dept: PRIMARY CARE CLINIC | Age: 38
End: 2023-05-04
Payer: COMMERCIAL

## 2023-05-04 VITALS
SYSTOLIC BLOOD PRESSURE: 130 MMHG | HEART RATE: 79 BPM | WEIGHT: 192.2 LBS | DIASTOLIC BLOOD PRESSURE: 80 MMHG | BODY MASS INDEX: 28.38 KG/M2 | OXYGEN SATURATION: 99 % | TEMPERATURE: 97.1 F

## 2023-05-04 DIAGNOSIS — M25.512 CHRONIC PAIN OF BOTH SHOULDERS: Primary | Chronic | ICD-10-CM

## 2023-05-04 DIAGNOSIS — G89.29 CHRONIC PAIN OF BOTH SHOULDERS: Primary | Chronic | ICD-10-CM

## 2023-05-04 DIAGNOSIS — M25.511 CHRONIC PAIN OF BOTH SHOULDERS: Primary | Chronic | ICD-10-CM

## 2023-05-04 DIAGNOSIS — M54.2 CERVICAL PAIN (NECK): Chronic | ICD-10-CM

## 2023-05-04 PROCEDURE — 1036F TOBACCO NON-USER: CPT | Performed by: FAMILY MEDICINE

## 2023-05-04 PROCEDURE — G8419 CALC BMI OUT NRM PARAM NOF/U: HCPCS | Performed by: FAMILY MEDICINE

## 2023-05-04 PROCEDURE — G8427 DOCREV CUR MEDS BY ELIG CLIN: HCPCS | Performed by: FAMILY MEDICINE

## 2023-05-04 PROCEDURE — 99214 OFFICE O/P EST MOD 30 MIN: CPT | Performed by: FAMILY MEDICINE

## 2023-05-04 ASSESSMENT — ENCOUNTER SYMPTOMS
DIARRHEA: 0
NAUSEA: 0
VOMITING: 0
COUGH: 0
BACK PAIN: 1
CONSTIPATION: 0
SHORTNESS OF BREATH: 0

## 2023-05-05 ENCOUNTER — HOSPITAL ENCOUNTER (OUTPATIENT)
Dept: PHYSICAL THERAPY | Age: 38
Setting detail: THERAPIES SERIES
Discharge: HOME | End: 2023-05-05
Payer: COMMERCIAL

## 2023-05-09 ENCOUNTER — TELEPHONE (OUTPATIENT)
Dept: PRIMARY CARE CLINIC | Age: 38
End: 2023-05-09

## 2023-05-09 ENCOUNTER — HOSPITAL ENCOUNTER (OUTPATIENT)
Dept: PHYSICAL THERAPY | Age: 38
Setting detail: THERAPIES SERIES
Discharge: HOME | End: 2023-05-09
Payer: COMMERCIAL

## 2023-05-09 PROCEDURE — 97112 NEUROMUSCULAR REEDUCATION: CPT

## 2023-05-09 PROCEDURE — 97140 MANUAL THERAPY 1/> REGIONS: CPT

## 2023-05-09 NOTE — FLOWSHEET NOTE
[address] [facility]. [address] [address]  Phone: [phone]   Fax:     [phone]  Date:  2023    Patient Name:  Humaira Trinidad    :  1985  MRN: [id number]    Pertinent Medical History: Additional Pertinent Hx: No PMH on file. Medical/Treatment Diagnosis Information:  Medical Diagnosis: Lumbago with sciatica, right side [M54.41]  Treatment Diagnosis: Lumbar pain with radicular symptoms leading to a strength and mobility deficit. Insurance/Certification information:  PT Insurance Information: [facility]  Physician Information:  ALBINO Wyatt  Plan of care signed (Y/N): routed    Date of Patient follow up with Physician:      Progress Report: []  Yes  [x]  No     Date Range for reporting period:  Beginnin2023  Ending:     Progress report due (10 Rx/or 30 days whichever is less): 5/18/10     Recertification due (POC duration/ or 90 days whichever is less):      Visit # Insurance/POC Allowable Auth Needed    by 23 []Yes    []No       Functional Scale:       Date Assessed: at eval  Test:Oswestry- 19  Score:     Pain level:  5/10     History of Injury:Patient is a 41 y/o male with a long hx of lbp with an increase in pain on 23 after falling in the  shower. He originally injured his lb, shoulders, and neck while lifting in the gym in . He has had problems off and on since. He c/o constant pain in his lb that is severe at times. He has occasional numbness in his right toes. He also has cervical pain and occasional n+t into his fingers. He wakes from pain. He denies bowel or bladder and saddle symptoms. He walks for exercise.      SUBJECTIVE:  Pt states, \" I have been in pain since  \"  3/31/23  Pt states, \" I can stand for an hour now \"  23  Pt states, \" Back is just stiff \"  23  Pt states, \" The stretches are helping for my neck \"  23

## 2023-05-17 ENCOUNTER — HOSPITAL ENCOUNTER (OUTPATIENT)
Dept: PHYSICAL THERAPY | Age: 38
Setting detail: THERAPIES SERIES
Discharge: HOME | End: 2023-05-17
Payer: COMMERCIAL

## 2023-05-17 NOTE — FLOWSHEET NOTE
[address] [facility].  [place] [address]  Phone: [phone]   Fax:     [phone]    Physical Therapy  Cancellation/No-show Note  Patient Name:  Brooke Jade  :  1985   Date:  2023  Cancelled visits to date: 2  No-shows to date: 1    Patient status for today's appointment patient:  []  Cancelled  []  Rescheduled appointment  [x]  No-show     Reason given by patient:  []  Patient ill  []  Conflicting appointment  []  No transportation    []  Conflict with work  []  No reason given  []  Other:     Comments:      Phone call information:   []  Phone call made today to patient at _ time at number provided:      []  Patient answered, conversation as follows:    [x]  Patient did not answer, message left as follows: \" We will have to take you off the schedule if you miss one more visit  []  Phone call not made today    Electronically signed by:  Alisson Koch, PT

## 2023-05-18 ENCOUNTER — TELEPHONE (OUTPATIENT)
Dept: PRIMARY CARE CLINIC | Age: 38
End: 2023-05-18

## 2023-05-19 ENCOUNTER — HOSPITAL ENCOUNTER (OUTPATIENT)
Dept: PHYSICAL THERAPY | Age: 38
Setting detail: THERAPIES SERIES
Discharge: HOME | End: 2023-05-19
Payer: COMMERCIAL

## 2023-05-19 PROCEDURE — 97140 MANUAL THERAPY 1/> REGIONS: CPT

## 2023-05-19 PROCEDURE — 97110 THERAPEUTIC EXERCISES: CPT

## 2023-05-19 NOTE — FLOWSHEET NOTE
(29847)     [] ES (un) (96895):   [] Vasopump (68401) [] Ionto (78113)   [] Other:    If [facility] Please Indicate Time In/Out  CPT Code Time in Time out                                   Approval Dates:  CPT Code Units Approved Units Used  Date Updated:                     GOALS:GOALS:  Patient stated goal: \" I want to have less pain and increased function  [x] Progressing: [] Met: [] Not Met: [] Adjusted  Therapist goals for Patient:   Short Term Goals: To be achieved in: 2 weeks  1. Independent in HEP and progression per patient tolerance, in order to prevent re-injury. [x] Progressing: [] Met: [] Not Met: [] Adjusted  2. Patient will have a decrease in pain to facilitate improvement in movement, function, and ADLs as indicated by Functional Deficits. [x] Progressing: [] Met: [] Not Met: [] Adjusted     Long Term Goals: To be achieved in: 8 weeks  1. Increase FOTO functional outcome score from 19 to 15  to assist with reaching prior level of function. [x] Progressing: [] Met: [] Not Met: [] Adjusted  2. Patient will demonstrate increased AROM to WNL, good LS mobility, good hip ROM to allow for proper joint functioning as indicated by patients Functional Deficits. [x] Progressing: [] Met: [] Not Met: [] Adjusted  3. Patient will demonstrate an increase in Strength to good proximal hip and core activation to allow for proper functional mobility as indicated by patients Functional Deficits. [x] Progressing: [] Met: [] Not Met: [] Adjusted  4. 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    [x] Progressing: [] Met: [] Not Met: [] Adjusted    ASSESSMENT:  See eval    Treatment/Activity Tolerance:  [x] Patient tolerated treatment well [] Patient limited by fatique  [] Patient limited by pain  [] Patient limited by other medical complications  [] Other:     Overall Progression Towards Functional goals/ Treatment Progress Update:  [] Patient is progressing

## 2023-06-14 ENCOUNTER — TELEPHONE (OUTPATIENT)
Dept: PRIMARY CARE CLINIC | Age: 38
End: 2023-06-14

## 2023-06-16 ENCOUNTER — HOSPITAL ENCOUNTER (OUTPATIENT)
Dept: PHYSICAL THERAPY | Age: 38
Setting detail: THERAPIES SERIES
End: 2023-06-16
Payer: COMMERCIAL

## 2023-06-21 ENCOUNTER — HOSPITAL ENCOUNTER (OUTPATIENT)
Dept: PHYSICAL THERAPY | Age: 38
Setting detail: THERAPIES SERIES
Discharge: HOME | End: 2023-06-21
Payer: COMMERCIAL

## 2023-06-21 ENCOUNTER — OFFICE VISIT (OUTPATIENT)
Dept: PRIMARY CARE CLINIC | Age: 38
End: 2023-06-21

## 2023-06-21 VITALS
BODY MASS INDEX: 27.47 KG/M2 | HEART RATE: 86 BPM | OXYGEN SATURATION: 99 % | WEIGHT: 186 LBS | DIASTOLIC BLOOD PRESSURE: 69 MMHG | SYSTOLIC BLOOD PRESSURE: 111 MMHG | TEMPERATURE: 97.2 F

## 2023-06-21 DIAGNOSIS — M25.511 CHRONIC PAIN OF BOTH SHOULDERS: ICD-10-CM

## 2023-06-21 DIAGNOSIS — L23.7 ALLERGIC CONTACT DERMATITIS DUE TO PLANTS, EXCEPT FOOD: Primary | ICD-10-CM

## 2023-06-21 DIAGNOSIS — M25.512 CHRONIC PAIN OF BOTH SHOULDERS: ICD-10-CM

## 2023-06-21 DIAGNOSIS — G89.29 CHRONIC PAIN OF BOTH SHOULDERS: ICD-10-CM

## 2023-06-21 DIAGNOSIS — M54.2 CERVICAL PAIN (NECK): ICD-10-CM

## 2023-06-21 PROCEDURE — 97112 NEUROMUSCULAR REEDUCATION: CPT

## 2023-06-21 PROCEDURE — 97110 THERAPEUTIC EXERCISES: CPT

## 2023-06-21 PROCEDURE — 97140 MANUAL THERAPY 1/> REGIONS: CPT

## 2023-06-21 RX ORDER — DIPHENHYDRAMINE HCL 25 MG
25 CAPSULE ORAL EVERY 6 HOURS PRN
Qty: 40 CAPSULE | Refills: 0 | Status: SHIPPED | OUTPATIENT
Start: 2023-06-21 | End: 2023-07-01

## 2023-06-21 RX ORDER — METHOCARBAMOL 750 MG/1
750 TABLET, FILM COATED ORAL 4 TIMES DAILY
Qty: 40 TABLET | Refills: 0 | Status: SHIPPED | OUTPATIENT
Start: 2023-06-21 | End: 2023-07-01

## 2023-06-21 ASSESSMENT — ENCOUNTER SYMPTOMS
VOMITING: 0
SHORTNESS OF BREATH: 0
COUGH: 0
CONSTIPATION: 0
BACK PAIN: 1
NAUSEA: 0
DIARRHEA: 0

## 2023-06-21 NOTE — TELEPHONE ENCOUNTER
Spoke with the patient today and patient states that this is her number to the massage therapy phone number [phone]

## 2023-06-21 NOTE — PROGRESS NOTES
Erick Nolasco (:  1985) is a 42 y.o. male,Established patient, here for evaluation of the following chief complaint(s):  Shoulder Pain and Lower Back Pain      SUBJECTIVE:  6:    Reviewed ortho notes as well as PT notes  Missed appt once for his PT appt  Is planning to have MRI done  Pain is worsening, moving lower than what it was before. Has PT appts still scheduled -- needs off work until 6. for his     Did get some contact dermatitis to his lower lateral shins bilaterally, recommended benadryl cream or oral antihistamines      5.23: Following up after multiple PT treatments. Still has another 4 scheduled, is going to need to follow with ortho ALBINO Langston afterwards to decide if MRI is needed    Has appts for PT scheduled until 23 -- needs more PT but for neck and bilateral shoulders  Will order posture support brace for shoulders, will fax to [employer] or [address] [place] [address],[address] is  from his wife, she is still not stable mentally. She has the children in a hotel, and he sees them every other day and takes them to/from school and after school activities. 316.23 --   Only recently started with physical therapy due to family strain and issues with his wife. May need MRI if he does not improve after finishing physical therapy. Will follow up with him in first week of May after his sessions to evaluate. 2.10.23: Follow up on lumbar spine pain  Still having pain, will refer to ortho    Having shooting pain down his right leg, worse with hip flexion and with standing. Feels better with laying flat or sitting down with his legs extended. 23 -- new patient    Was in the shower this morning, today, feels like he strained his back after he bend down to grab something. He has had low back pain in the past which took approx 1 mo to heal before. Has strain of his back in 2016  and had to walk hunched over for 4 days.     Works as a long distance truck

## 2023-06-21 NOTE — FLOWSHEET NOTE
[address] [facility] [place]. Ankit Barros [address]  Phone: [phone]   Fax:     [phone]    Physical Therapy Treatment Note/ Progress Report:     Date:  2023    Patient Name:  Samara Abdi    :  1985  MRN: [id number]    Pertinent Medical History: Additional Pertinent Hx: No PMH on file. Medical/Treatment Diagnosis Information:  Medical Diagnosis: Lumbago with sciatica, right side [M54.41]  Pain in right shoulder [M25.511]  Other chronic pain [G89.29]  Pain in left shoulder [M25.512]  Treatment Diagnosis: Decreased ability to use ue's and turn neck    Insurance/Certification information:  PT Insurance Information: [facility]  Physician Information:  ALBINO Melgoza  Plan of care signed (Y/N): routed    Date of Patient follow up with Physician:      Progress Report: []  Yes  [x]  No     Date Range for reporting period:  Beginnin2023  Ending:     Progress report due (10 Rx/or 30 days whichever is less):      Recertification due (POC duration/ or 90 days whichever is less):      Visit # Insurance/POC Allowable Auth Needed   2 128 by 10/1/23 []Yes    []No     Functional Outcomes Measure:      Test: NDI-33  Date Assessed Score               Pain level:  10     HISTORY OF INJURY:Patient is a 41 y/o male with a long hx of cervical and bilat shoulder pain for  the past few months without injury. He c/o constant aching pain in his bilat traps with the right being worse. He is not working at this time. He also has a lb problem. He has occasional numbness that seems to be from his sleeping positions. He wakes from sleep . He hopes to have a decrease in pain and increase in pain.          SUBJECTIVE:  Pt states, \" My neck and shoulders hurt all the time \"  23  Pt states, \" Doing a little better \"    OBJECTIVE:   CERV ROM       Cervical Flexion 30     Cervical Extension 20

## 2023-06-23 ENCOUNTER — HOSPITAL ENCOUNTER (OUTPATIENT)
Dept: PHYSICAL THERAPY | Age: 38
Setting detail: THERAPIES SERIES
End: 2023-06-23
Payer: COMMERCIAL

## 2023-06-27 ENCOUNTER — HOSPITAL ENCOUNTER (OUTPATIENT)
Dept: PHYSICAL THERAPY | Age: 38
Setting detail: THERAPIES SERIES
Discharge: HOME | End: 2023-06-27
Payer: COMMERCIAL

## 2023-06-29 ENCOUNTER — HOSPITAL ENCOUNTER (OUTPATIENT)
Dept: PHYSICAL THERAPY | Age: 38
Setting detail: THERAPIES SERIES
Discharge: HOME | End: 2023-06-29
Payer: COMMERCIAL

## 2023-07-12 ENCOUNTER — HOSPITAL ENCOUNTER (OUTPATIENT)
Dept: PHYSICAL THERAPY | Age: 38
Setting detail: THERAPIES SERIES
Discharge: HOME | End: 2023-07-12
Payer: COMMERCIAL

## 2023-07-12 PROCEDURE — 97112 NEUROMUSCULAR REEDUCATION: CPT

## 2023-07-12 PROCEDURE — 97110 THERAPEUTIC EXERCISES: CPT

## 2023-07-12 PROCEDURE — 97140 MANUAL THERAPY 1/> REGIONS: CPT

## 2023-07-12 NOTE — FLOWSHEET NOTE
[address] [facility]. [address]  Phone: [phone]   Fax:     [phone]    Physical Therapy Treatment Note/ Progress Report:     Date:  2023    Patient Name:  Kim Esteban    :  1985  MRN: [id number]    Pertinent Medical History: Additional Pertinent Hx: No PMH on file. Medical/Treatment Diagnosis Information:  Medical Diagnosis: Lumbago with sciatica, right side [M54.41]  Pain in right shoulder [M25.511]  Other chronic pain [G89.29]  Pain in left shoulder [M25.512]  Treatment Diagnosis: Decreased ability to use ue's and turn neck    Insurance/Certification information:  PT Insurance Information: [facility]  Physician Information:  ALBINO Cool  Plan of care signed (Y/N): routed    Date of Patient follow up with Physician:      Progress Report: []  Yes  [x]  No     Date Range for reporting period:  Beginnin2023  Ending:     Progress report due (10 Rx/or 30 days whichever is less):      Recertification due (POC duration/ or 90 days whichever is less):      Visit # Insurance/POC Allowable Auth Needed   3 128 by 10/1/23 []Yes    []No     Functional Outcomes Measure:      Test: NDI-33  Date Assessed Score               Pain level: 4/10     HISTORY OF INJURY:Patient is a 43 y/o male with a long hx of cervical and bilat shoulder pain for  the past few months without injury. He c/o constant aching pain in his bilat traps with the right being worse. He is not working at this time. He also has a lb problem. He has occasional numbness that seems to be from his sleeping positions. He wakes from sleep . He hopes to have a decrease in pain and increase in pain.          SUBJECTIVE:  Pt states, \" My neck and shoulders hurt all the time \"  23  Pt states, \" Doing a little better \"  23  Pt states, \" I feel close to 50% better \"     OBJECTIVE:   CERV ROM

## 2023-07-18 ENCOUNTER — HOSPITAL ENCOUNTER (OUTPATIENT)
Dept: PHYSICAL THERAPY | Age: 38
Setting detail: THERAPIES SERIES
Discharge: HOME | End: 2023-07-18
Payer: COMMERCIAL

## 2023-07-18 PROCEDURE — 97140 MANUAL THERAPY 1/> REGIONS: CPT

## 2023-07-18 PROCEDURE — 97112 NEUROMUSCULAR REEDUCATION: CPT

## 2023-07-18 PROCEDURE — 97110 THERAPEUTIC EXERCISES: CPT

## 2023-07-18 NOTE — FLOWSHEET NOTE
achieved in: 2 weeks  1. Independent in HEP and progression per patient tolerance, in order to prevent re-injury. [] Progressing: [] Met: [] Not Met: [] Adjusted  2. Patient will have a decrease in pain to facilitate improvement in movement, function, and ADLs as indicated by Functional Deficits. [] Progressing: [] Met: [] Not Met: [] Adjusted     Long Term Goals: To be achieved in: 8 weeks  1. Decrease NDI functional outcome score from 33 to 25  to assist with reaching prior level of function. [] Progressing: [] Met: [] Not Met: [] Adjusted  2. Patient will demonstrate increased AROM to [facility] of cervical/thoracic spine to allow for proper joint functioning as indicated by patients Functional Deficits. [] Progressing: [] Met: [] Not Met: [] Adjusted  3. Patient will demonstrate an increase in postural awareness and control and activation of  Deep cervical stabilizers to allow for proper functional mobility as indicated by patients Functional Deficits. [] Progressing: [] Met: [] Not Met: [] Adjusted  4. Patient will return to adl's functional activities without increased symptoms or restriction. [] Progressing: [] Met: [] Not Met: [] Adjusted  5  [] Progressing: [] Met: [] Not Met: [] Adjusted         ASSESSMENT:  See eval    Treatment/Activity Tolerance:  [x] Patient tolerated treatment well [] Patient limited by fatique  [] Patient limited by pain  [] Patient limited by other medical complications  [] Other:     Overall Progression Towards Functional goals/ Treatment Progress Update:  [] Patient is progressing as expected towards functional goals listed. [] Progression is slowed due to complexities/Impairments listed. [] Progression has been slowed due to co-morbidities.   [x] Plan just implemented, too soon to assess goals progression <30days   [] Goals require adjustment due to lack of progress  [] Patient is not progressing as expected and requires additional follow up with physician  []

## 2023-07-20 ENCOUNTER — HOSPITAL ENCOUNTER (OUTPATIENT)
Dept: PHYSICAL THERAPY | Age: 38
Setting detail: THERAPIES SERIES
Discharge: HOME | End: 2023-07-20
Payer: COMMERCIAL

## 2023-07-20 PROCEDURE — 97110 THERAPEUTIC EXERCISES: CPT

## 2023-07-20 PROCEDURE — 97140 MANUAL THERAPY 1/> REGIONS: CPT

## 2023-07-20 PROCEDURE — 97112 NEUROMUSCULAR REEDUCATION: CPT

## 2023-07-20 NOTE — FLOWSHEET NOTE
[address] [facility]. [address]  Phone: [phone]   Fax:     [phone]    Physical Therapy Treatment Note/ Progress Report:     Date:  2023    Patient Name:  Eduarda Duran    :  1985  MRN: [id number]    Pertinent Medical History: Additional Pertinent Hx: No PMH on file. Medical/Treatment Diagnosis Information:  Medical Diagnosis: Lumbago with sciatica, right side [M54.41]  Pain in right shoulder [M25.511]  Other chronic pain [G89.29]  Pain in left shoulder [M25.512]  Treatment Diagnosis: Decreased ability to use ue's and turn neck    Insurance/Certification information:  PT Insurance Information: [facility]  Physician Information:  ALBINO Ugarte  Plan of care signed (Y/N): routed    Date of Patient follow up with Physician:      Progress Report: []  Yes  [x]  No     Date Range for reporting period:  Beginnin2023  Ending:     Progress report due (10 Rx/or 30 days whichever is less):      Recertification due (POC duration/ or 90 days whichever is less):      Visit # Insurance/POC Allowable Auth Needed   5* 128 by 10/1/23 []Yes    []No     Functional Outcomes Measure:      Test: NDI-33  Date Assessed Score               Pain level: 10     HISTORY OF INJURY:Patient is a 41 y/o male with a long hx of cervical and bilat shoulder pain for  the past few months without injury. He c/o constant aching pain in his bilat traps with the right being worse. He is not working at this time. He also has a lb problem. He has occasional numbness that seems to be from his sleeping positions. He wakes from sleep . He hopes to have a decrease in pain and increase in pain.          SUBJECTIVE:  Pt states, \" My neck and shoulders hurt all the time \"  23  Pt states, \" Doing a little better \"  23  Pt states, \" I feel close to 50% better \"   23  Pt states, \" I'm a

## 2023-07-25 ENCOUNTER — HOSPITAL ENCOUNTER (OUTPATIENT)
Dept: PHYSICAL THERAPY | Age: 38
Setting detail: THERAPIES SERIES
Discharge: HOME | End: 2023-07-25
Payer: COMMERCIAL

## 2023-07-25 NOTE — FLOWSHEET NOTE
[address] [address]  Phone: [phone]   Fax:     [phone]    Physical Therapy  Cancellation/No-show Note  Patient Name:  Fiona Salvador  :  1985   Date:  2023  Cancelled visits to date: 3  No-shows to date: 0    Patient status for today's appointment patient:  [x]  Cancelled  []  Rescheduled appointment  []  No-show     Reason given by patient:  []  Patient ill  []  Conflicting appointment  [x]  No transportation    []  Conflict with work  []  No reason given  []  Other:     Comments:      Phone call information:   []  Phone call made today to patient at _ time at number provided:      []  Patient answered, conversation as follows:    []  Patient did not answer, message left as follows:  []  Phone call not made today    Electronically signed by:  Amilcar Monteiro PT

## 2023-08-07 ENCOUNTER — TELEPHONE (OUTPATIENT)
Dept: PRIMARY CARE CLINIC | Age: 38
End: 2023-08-07

## 2023-08-07 DIAGNOSIS — M54.2 CERVICAL PAIN (NECK): Primary | ICD-10-CM

## 2023-08-07 DIAGNOSIS — G89.29 CHRONIC PAIN OF BOTH SHOULDERS: ICD-10-CM

## 2023-08-07 DIAGNOSIS — M25.511 CHRONIC PAIN OF BOTH SHOULDERS: ICD-10-CM

## 2023-08-07 DIAGNOSIS — G89.29 CHRONIC BILATERAL LOW BACK PAIN WITHOUT SCIATICA: ICD-10-CM

## 2023-08-07 DIAGNOSIS — M54.50 CHRONIC BILATERAL LOW BACK PAIN WITHOUT SCIATICA: ICD-10-CM

## 2023-08-07 DIAGNOSIS — M25.512 CHRONIC PAIN OF BOTH SHOULDERS: ICD-10-CM

## 2023-08-07 NOTE — TELEPHONE ENCOUNTER
----- Message from Balwinder Brewer sent at 8/7/2023  2:47 PM EDT -----  Subject: Referral Request    Reason for referral request? Physical Therapy -[facility] -   Henri Wong PT - Patient advising needs renewal of referral   Provider patient wants to be referred to(if known):     Provider Phone Number(if known):[phone]    Additional Information for Provider?   ---------------------------------------------------------------------------  --------------  Oneyda Marine Alvaro    [phone]; OK to leave message on voicemail  ---------------------------------------------------------------------------  --------------

## 2023-10-27 ENCOUNTER — TELEMEDICINE (OUTPATIENT)
Dept: PRIMARY CARE CLINIC | Age: 38
End: 2023-10-27
Payer: COMMERCIAL

## 2023-10-27 DIAGNOSIS — G89.29 CHRONIC BILATERAL LOW BACK PAIN WITHOUT SCIATICA: ICD-10-CM

## 2023-10-27 DIAGNOSIS — M54.2 CERVICAL PAIN (NECK): ICD-10-CM

## 2023-10-27 DIAGNOSIS — M25.512 CHRONIC PAIN OF BOTH SHOULDERS: Primary | ICD-10-CM

## 2023-10-27 DIAGNOSIS — M25.511 CHRONIC PAIN OF BOTH SHOULDERS: Primary | ICD-10-CM

## 2023-10-27 DIAGNOSIS — G89.29 CHRONIC PAIN OF BOTH SHOULDERS: Primary | ICD-10-CM

## 2023-10-27 DIAGNOSIS — M54.50 CHRONIC BILATERAL LOW BACK PAIN WITHOUT SCIATICA: ICD-10-CM

## 2023-10-27 PROCEDURE — G8427 DOCREV CUR MEDS BY ELIG CLIN: HCPCS | Performed by: FAMILY MEDICINE

## 2023-10-27 PROCEDURE — 99214 OFFICE O/P EST MOD 30 MIN: CPT | Performed by: FAMILY MEDICINE
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
## 2023-10-30 PROBLEM — M54.50 CHRONIC BILATERAL LOW BACK PAIN WITHOUT SCIATICA: Chronic | Status: ACTIVE | Noted: 2019-10-25

## 2023-10-30 PROBLEM — G89.29 CHRONIC BILATERAL LOW BACK PAIN WITHOUT SCIATICA: Chronic | Status: ACTIVE | Noted: 2019-10-25

## 2023-10-30 PROBLEM — M54.41 ACUTE RIGHT-SIDED LOW BACK PAIN WITH RIGHT-SIDED SCIATICA: Status: RESOLVED | Noted: 2023-02-15 | Resolved: 2023-10-30

## 2023-10-30 ASSESSMENT — ENCOUNTER SYMPTOMS
VOMITING: 0
COUGH: 0
DIARRHEA: 0
CONSTIPATION: 0
SHORTNESS OF BREATH: 0
NAUSEA: 0

## 2023-10-30 NOTE — FLOWSHEET NOTE
reduced his pain. He had a previous episode of PT for his low back and he felt that it was helpful. Test used Initial score  10/30/23 11/01/2023   Pain Summary VAS 7-8/10    Functional questionnaire Modified Oswestry 25/50    Other:                OBJECTIVE EXAMINATION     Observation:     Test measurements: see eval    Exercises/Interventions:     Therapeutic Ex (65016)  resistance Sets/time Reps Notes/Cues/Progressions   See HEP below                                                                      Manual Intervention (10808)  TIME                                        NMR re-education (38656) resistance Sets/time Reps CUES NEEDED                                      Therapeutic Activity (16908)  Sets/time     Patient education  8'  HEP, stretching, joint mobilizations                                 Modalities:    No modalities applied this session    Education/Home Exercise Program: Patient HEP program created electronically. Refer to [address] [place] [address] Alvaro access code:    Access Code: [email or web address]  URL: [email or web address]  Date: 11/01/2023  Prepared by: Jeramie Schilling    Exercises  - Prone Press Up On Elbows  - 1 x daily - 7 x weekly - 10 reps - 10 hold  - Supine Lower Trunk Rotation  - 1 x daily - 7 x weekly - 10 reps - 5 hold  - Supine Bridge  - 1 x daily - 7 x weekly - 3 sets - 10 reps  - Seated Hamstring Stretch  - 1 x daily - 7 x weekly - 3 sets - 30 hold  - Supine Figure 4 Piriformis Stretch  - 1 x daily - 7 x weekly - 3 sets - 30 hold      ASSESSMENT     Today's Assessment: See Eval    Medical Necessity Documentation:  I certify that this patient meets the below criteria necessary for medical necessity for care and/or justification of therapy services: The patient has a complexity identified by an ICD-10 code that has a direct and significant impact on the need for therapy.   (Significantly impacts the rate of recovery and is associated with a primary condition.)     Treatment/Activity

## 2023-11-01 ENCOUNTER — HOSPITAL ENCOUNTER (OUTPATIENT)
Dept: ULTRASOUND IMAGING | Age: 38
Discharge: HOME | End: 2023-11-01
Payer: COMMERCIAL

## 2023-11-01 ENCOUNTER — TELEPHONE (OUTPATIENT)
Dept: PRIMARY CARE CLINIC | Age: 38
End: 2023-11-01

## 2023-11-01 ENCOUNTER — HOSPITAL ENCOUNTER (OUTPATIENT)
Dept: PHYSICAL THERAPY | Age: 38
Setting detail: THERAPIES SERIES
Discharge: HOME | End: 2023-11-01
Attending: FAMILY MEDICINE
Payer: COMMERCIAL

## 2023-11-01 DIAGNOSIS — G89.29 CHRONIC RIGHT-SIDED LOW BACK PAIN WITH RIGHT-SIDED SCIATICA: Primary | ICD-10-CM

## 2023-11-01 DIAGNOSIS — N50.89 TESTICLE SWELLING: ICD-10-CM

## 2023-11-01 DIAGNOSIS — M54.41 CHRONIC RIGHT-SIDED LOW BACK PAIN WITH RIGHT-SIDED SCIATICA: Primary | ICD-10-CM

## 2023-11-01 DIAGNOSIS — R29.898 WEAKNESS OF BOTH HIPS: ICD-10-CM

## 2023-11-01 DIAGNOSIS — M53.86 DECREASED ROM OF LUMBAR SPINE: ICD-10-CM

## 2023-11-01 PROCEDURE — 97161 PT EVAL LOW COMPLEX 20 MIN: CPT

## 2023-11-01 PROCEDURE — 76870 US EXAM SCROTUM: CPT

## 2023-11-01 PROCEDURE — 97110 THERAPEUTIC EXERCISES: CPT

## 2023-11-01 PROCEDURE — 97530 THERAPEUTIC ACTIVITIES: CPT

## 2023-11-01 NOTE — TELEPHONE ENCOUNTER
Was unable to speak with the patient cell message states that can't take the call at this time could not let the patient know that his work note is done and will be up at the

## 2023-11-01 NOTE — PLAN OF CARE
for the following treatment interventions:    Interventions:  [x] Therapeutic exercise including: strength training, ROM, including postural re-education. [x] NMR activation and proprioception, including postural re-education. [x] Manual therapy as indicated to include: PROM, Gr I-IV mobilizations, STM, and Dry Needling/IASTM  [x] Modalities as needed that may include: Cryotherapy, Thermal Agents, and Traction  [x] Patient education on joint protection, postural re-education, activity modification, progression of HEP. HEP instruction: Refer to HEP instructions outlined on the flowsheet on 11/01/2023. Access Code: [email or web address]  URL: [email or web address]  Date: 11/01/2023  Prepared by: Migue Seymour    Exercises  - Prone Press Up On Elbows  - 1 x daily - 7 x weekly - 10 reps - 10 hold  - Supine Lower Trunk Rotation  - 1 x daily - 7 x weekly - 10 reps - 5 hold  - Supine Bridge  - 1 x daily - 7 x weekly - 3 sets - 10 reps  - Seated Hamstring Stretch  - 1 x daily - 7 x weekly - 3 sets - 30 hold  - Supine Figure 4 Piriformis Stretch  - 1 x daily - 7 x weekly - 3 sets - 30 hold      Electronically Signed by Migue Seymour PT, DPT Date: 11/01/2023

## 2023-11-06 ENCOUNTER — HOSPITAL ENCOUNTER (OUTPATIENT)
Dept: PHYSICAL THERAPY | Age: 38
Setting detail: THERAPIES SERIES
Discharge: HOME | End: 2023-11-06
Attending: FAMILY MEDICINE
Payer: COMMERCIAL

## 2023-11-06 PROCEDURE — 97140 MANUAL THERAPY 1/> REGIONS: CPT

## 2023-11-06 PROCEDURE — 97110 THERAPEUTIC EXERCISES: CPT
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
## 2023-11-08 ENCOUNTER — HOSPITAL ENCOUNTER (OUTPATIENT)
Dept: PHYSICAL THERAPY | Age: 38
Setting detail: THERAPIES SERIES
Discharge: HOME | End: 2023-11-08
Attending: FAMILY MEDICINE
Payer: COMMERCIAL

## 2023-11-08 NOTE — FLOWSHEET NOTE
[address] [place] [address], [address] [place] [address], [address] [place] [address] office: [phone] fax: [phone]    Physical Therapy  Cancellation/No-show Note  Patient Name:  Joel Barrera  :  1985   Date:  2023  Cancelled visits to date:   No-shows to date: 0    For today's appointment patient:  [x]  Cancelled  []  Rescheduled appointment  []  No-show     Reason given by patient:  []  Patient ill  []  Conflicting appointment  []  No transportation    []  Conflict with work  [x]  No reason given  []  Other:     Comments:      Phone call information:   []  Phone call made today to patient at _ time at number provided:      []  Patient answered, conversation as follows:    []  Patient did not answer, message left as follows:  []  Phone call not made today  [x]  Phone call not needed - pt contacted us to cancel and provided reason for cancellation.      Electronically signed by:  Gaby Ventura PT, DPT

## 2023-11-13 ENCOUNTER — HOSPITAL ENCOUNTER (OUTPATIENT)
Dept: PHYSICAL THERAPY | Age: 38
Setting detail: THERAPIES SERIES
Discharge: HOME | End: 2023-11-13
Attending: FAMILY MEDICINE
Payer: COMMERCIAL

## 2023-11-13 PROCEDURE — 97140 MANUAL THERAPY 1/> REGIONS: CPT

## 2023-11-13 PROCEDURE — 97110 THERAPEUTIC EXERCISES: CPT

## 2023-11-13 NOTE — FLOWSHEET NOTE
swelling/inflammation/restriction, improving soft tissue extensibility and allowing for proper ROM for normal function with self care, mobility, lifting and ambulation  (03768) MECHANICAL TRACTION  (19892) Needle insertion(s) without injection; 1 or 2 muscle(s). (03260) Needle insertion(s) without injection; 3 or more muscle(s)    TREATMENT PLAN   Plan: Cont POC- Continue emphasis/focus on exercise progression, improving proper muscle recruitment and activation/motor control patterns, modulating pain, promoting relaxation, and increasing ROM. Next visit plan to progress reps and add new exercises     Electronically Signed by Rebeca Loving PT, DPT              Date: 11/13/2023     Note: If patient does not return for scheduled/recommended follow up visits, this note will serve as a discharge from care along with the most recent update on progress.

## 2023-11-15 ENCOUNTER — APPOINTMENT (OUTPATIENT)
Dept: PHYSICAL THERAPY | Age: 38
End: 2023-11-15
Attending: FAMILY MEDICINE
Payer: COMMERCIAL

## 2023-11-20 ENCOUNTER — HOSPITAL ENCOUNTER (OUTPATIENT)
Dept: PHYSICAL THERAPY | Age: 38
Setting detail: THERAPIES SERIES
Discharge: HOME | End: 2023-11-20
Attending: FAMILY MEDICINE
Payer: COMMERCIAL

## 2023-11-20 ENCOUNTER — OFFICE VISIT (OUTPATIENT)
Dept: ORTHOPEDIC SURGERY | Age: 38
End: 2023-11-20
Payer: COMMERCIAL

## 2023-11-20 VITALS — WEIGHT: 188 LBS | HEIGHT: 69 IN | BODY MASS INDEX: 27.85 KG/M2

## 2023-11-20 DIAGNOSIS — M54.41 ACUTE RIGHT-SIDED LOW BACK PAIN WITH RIGHT-SIDED SCIATICA: ICD-10-CM

## 2023-11-20 DIAGNOSIS — M54.12 CERVICAL RADICULOPATHY: ICD-10-CM

## 2023-11-20 DIAGNOSIS — M54.2 NECK PAIN: Primary | ICD-10-CM

## 2023-11-20 PROCEDURE — 97140 MANUAL THERAPY 1/> REGIONS: CPT

## 2023-11-20 PROCEDURE — G8484 FLU IMMUNIZE NO ADMIN: HCPCS | Performed by: PHYSICIAN ASSISTANT

## 2023-11-20 PROCEDURE — G8419 CALC BMI OUT NRM PARAM NOF/U: HCPCS | Performed by: PHYSICIAN ASSISTANT

## 2023-11-20 PROCEDURE — G8427 DOCREV CUR MEDS BY ELIG CLIN: HCPCS | Performed by: PHYSICIAN ASSISTANT

## 2023-11-20 PROCEDURE — 1036F TOBACCO NON-USER: CPT | Performed by: PHYSICIAN ASSISTANT

## 2023-11-20 PROCEDURE — 99214 OFFICE O/P EST MOD 30 MIN: CPT | Performed by: PHYSICIAN ASSISTANT

## 2023-11-20 PROCEDURE — 97110 THERAPEUTIC EXERCISES: CPT

## 2023-11-20 PROCEDURE — 97530 THERAPEUTIC ACTIVITIES: CPT

## 2023-11-20 SDOH — HEALTH STABILITY: PHYSICAL HEALTH: ON AVERAGE, HOW MANY MINUTES DO YOU ENGAGE IN EXERCISE AT THIS LEVEL?: 10 MIN

## 2023-11-20 SDOH — HEALTH STABILITY: PHYSICAL HEALTH: ON AVERAGE, HOW MANY DAYS PER WEEK DO YOU ENGAGE IN MODERATE TO STRENUOUS EXERCISE (LIKE A BRISK WALK)?: 3 DAYS

## 2023-11-20 NOTE — PROGRESS NOTES
treatment options also reviewed in detail. I have outlined a treatment plan with them. He has had full opportunity to ask his questions. I have answered them all to his satisfaction. I feel that Mr. Lucinda Snyder understands our discussion today. Plan:      PT-referred to physical therapy for neck and arm pain. Further Imaging-   At this time I recommend proceeding with MRI of the lumbar spine. He has failed at least 6 weeks of conservative treatment which includes:   -Activity modification.  -Oral medications including Tylenol and gabapentin. .  -Physical Therapy for a total of 17 sessions over a period of 9 months within the past 9 months. (Dates attended PT: 2/27- 11/13). -A daily Home Exercise Therapy Program previously instructed by PT.   - Considering surgery or other therapeutic options such as epidural steroid injection, medial branch block injections or other interventional procedures. Follow up- after lumbar spine. Call or return to clinic if these symptoms worsen or fail to improve as anticipated. The total time spent on today's visit including reviewing test results, history, performance of physical exam, counseling/ education, ordering of medications, tests or procedures was 38 minutes. This time does include completion of the medical record. This time excludes any time spent performing procedures or tests in the office. Colleen Dodd PA-C   Senior Physician Assistant   [facility]                                         Disclaimer: This note was generated with use of a verbal recognition program (DRAGON) and an attempt was made to check for errors. It is possible that there are still dictated errors within this office note. If so, please bring any significant errors to my attention for an addendum. All efforts were made to ensure that this office note is accurate.
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
## 2023-11-27 NOTE — FLOWSHEET NOTE
[address] [facility] and Therapy 3000 [facility]., [address] office: [phone] fax: [phone]      Physical Therapy: TREATMENT/PROGRESS NOTE   Patient: Chase Sexton (85 y.o. male)   Treatment Date: 2023   :  1985 MRN: [id number]   Visit #: 5   Insurance Allowable Auth Needed   *** []Yes    []No    Insurance: Payor: Yanely Rosa / Plan: Bandar Koroma / Product Type: *No Product type* /   Insurance ID: [id number] - (Medicaid Managed)  Secondary Insurance (if applicable):    Treatment Diagnosis: ***  No diagnosis found.    Medical Diagnosis:    Neck pain [M54.2]  Cervical radiculopathy [M54.12]   Referring Physician: ALBINO Murcia  PCP: Jadiel Soriano DO                             Plan of care signed (Y/N):     Date of Patient follow up with Physician:      Progress Report/POC: EVAL today  POC update due: (10 visits /OR [address], whichever is less) *** 2023     *** (Cut and paste Precautions/Contraindications from Eval)    Preferred Language for Healthcare:   [x]English       []other:    SUBJECTIVE EXAMINATION     Patient Report/Comments: see eval     Test used Initial score  23   Pain Summary VAS ***    Functional questionnaire Neck Disability Index ***    Other:                OBJECTIVE EXAMINATION     Observation:     Test measurements: see eval    Exercises/Interventions:     Therapeutic Ex (35598)  resistance Sets/time Reps Notes/Cues/Progressions   See HEP below                                                                      Manual Intervention ([id number])  TIME                                        NMR re-education (47526) resistance Sets/time Reps CUES NEEDED                                      Therapeutic Activity (99783)  Sets/time     Patient education                                     Modalities:    No modalities applied this session    Education/Home Exercise Program: Patient HEP

## 2023-11-27 NOTE — PLAN OF CARE
co-morbidities that impact the plan of care  [x] A total of 4 or more elements found upon examination of body systems using standardized tests and measures addressing any of the following: body structures, functions (impairments), activity limitations, and/or participation restrictions  [x] A clinical presentation with stable and/or uncomplicated characteristics   [x] Clinical decision making of LOW (76974 - Typically 20 minutes face-to-face) complexity using standardized patient assessment instrument and/or measurable assessment of functional outcome. GOALS     Patient stated goal: ***  Status: [] Progressing: [] Met: [] Not Met: [] Adjusted    Therapist goals for Patient:   Short Term Goals: To be achieved in: 2 weeks  Independent in HEP and progression per patient tolerance, in order to progress toward full function and prevent re-injury. Status: [] Progressing: [] Met: [] Not Met: [] Adjusted  Patient will have a decrease in pain to ***/10 to help  facilitate improvement in movement, function, and ADLs as indicated by functional deficits. Status: [] Progressing: [] Met: [] Not Met: [] Adjusted    Long Term Goals: To be achieved in: ***weeks  Disability index score of ***% or less for the Neck Disability Index to assist with return to prior level of function. Status: [] Progressing: [] Met: [] Not Met: [] Adjusted  {ROM goal:87318} to allow for proper joint functioning as indicated by patients functional deficits. Status: [] Progressing: [] Met: [] Not Met: [] Adjusted  {Strength goal:12634}   Status: [] Progressing: [] Met: [] Not Met: [] Adjusted  Patient will return to {UE functions:65320} without increased symptoms or restriction to work towards return to prior level of function.        Status: [] Progressing: [] Met: [] Not Met: [] Adjusted  {UE Function ADL Goals:17018}            Status: [] Progressing: [] Met: [] Not Met: [] Adjusted    TREATMENT PLAN     Frequency/Duration: {POC:70713}/week

## 2023-11-29 ENCOUNTER — HOSPITAL ENCOUNTER (OUTPATIENT)
Dept: PHYSICAL THERAPY | Age: 38
Setting detail: THERAPIES SERIES
Discharge: HOME | End: 2023-11-29
Attending: FAMILY MEDICINE
Payer: COMMERCIAL

## 2023-11-29 NOTE — FLOWSHEET NOTE
[address] [facility] [address], [address] office: [phone] fax: [phone]    Physical Therapy  Cancellation/No-show Note  Patient Name:  Joshua Latham  :  1985   Date:  2023  Cancelled visits to date:   No-shows to date:  (eval- cervical )    For today's appointment patient:  []  Cancelled  []  Rescheduled appointment  [x]  No-show     Reason given by patient:  []  Patient ill  []  Conflicting appointment  []  No transportation    []  Conflict with work  [x]  No reason given  []  Other:     Comments:      Phone call information:   [x]  Phone call made today to patient at 1  time at number provided:   Patient was called by  to reschedule evaluation since he is a pre-established patient   []  Patient answered, conversation as follows:    []  Patient did not answer, message left as follows:  []  Phone call not made today  []  Phone call not needed - pt contacted us to cancel and provided reason for cancellation.      Electronically signed by:  Gil Jade, PT, DPT

## 2023-12-06 ENCOUNTER — HOSPITAL ENCOUNTER (OUTPATIENT)
Dept: PHYSICAL THERAPY | Age: 38
Setting detail: THERAPIES SERIES
Discharge: HOME | End: 2023-12-06
Attending: FAMILY MEDICINE
Payer: COMMERCIAL

## 2023-12-06 PROCEDURE — 97110 THERAPEUTIC EXERCISES: CPT

## 2023-12-06 PROCEDURE — 97530 THERAPEUTIC ACTIVITIES: CPT

## 2023-12-06 PROCEDURE — 97161 PT EVAL LOW COMPLEX 20 MIN: CPT

## 2023-12-08 ENCOUNTER — HOSPITAL ENCOUNTER (OUTPATIENT)
Dept: PHYSICAL THERAPY | Age: 38
Setting detail: THERAPIES SERIES
Discharge: HOME | End: 2023-12-08
Attending: FAMILY MEDICINE
Payer: COMMERCIAL

## 2023-12-08 PROCEDURE — 97110 THERAPEUTIC EXERCISES: CPT

## 2023-12-08 PROCEDURE — 97140 MANUAL THERAPY 1/> REGIONS: CPT

## 2023-12-08 NOTE — FLOWSHEET NOTE
[address] [facility], [address] office: [phone] fax: [phone]      Physical Therapy: TREATMENT/PROGRESS NOTE   Patient: Avril Olson (7 y.o. male)   Treatment Date: 2023   :  1985 MRN: [id number]   Visit #: 2  Insurance Allowable Auth Needed   / visits 23 to 24  [x]Yes    []No    Insurance: Payor: Jeffrey Oseguera / Plan: Li Hathaway / Product Type: *No Product type* /   Insurance ID: [id number] - (Medicaid Managed)  Secondary Insurance (if applicable):    Treatment Diagnosis:     ICD-10-CM    1. Neck pain on right side  M54.2       2. Decreased range of motion of intervertebral discs of cervical spine  M53.82       3. Neck stiffness  M43.6          Medical Diagnosis:    Neck pain [M54.2]  Cervical radiculopathy [M54.12]   Referring Physician: ALBINO Kirkland  PCP: Mary Glover DO                             Plan of care signed (Y/N):     Date of Patient follow up with Physician: None scheduled     Progress Report/POC: NO  POC update due: (10 visits /OR [address], whichever is less)  2024     No precautions    Preferred Language for Healthcare:   [x]English       []other:    SUBJECTIVE EXAMINATION     Patient Report/Comments: Pt states his exercises are helping. He notes soreness in his shoulder blades today. He has some neck pain on the R side today. Pt is a 33 yo male c/o of LBP with radiating symptoms into her R leg. He states he hurt his back in 2007, reason unknown. He also hurt his back by trying to lift his mother. He went to the gym and also hurt his back when lifting. In  he went to [place] to get acupuncture which helped, but his symptoms slowly returned. His R buttock and SIJ are most painful. He has numbness in the R foot as well. He can only 4-5 hours of sleep a night. His pain is worse with sitting in a soft couch.  Prescribed medication has

## 2023-12-11 ENCOUNTER — HOSPITAL ENCOUNTER (OUTPATIENT)
Dept: PHYSICAL THERAPY | Age: 38
Setting detail: THERAPIES SERIES
Discharge: HOME | End: 2023-12-11
Attending: FAMILY MEDICINE
Payer: COMMERCIAL

## 2023-12-11 PROCEDURE — 97140 MANUAL THERAPY 1/> REGIONS: CPT

## 2023-12-11 PROCEDURE — 97110 THERAPEUTIC EXERCISES: CPT

## 2023-12-11 PROCEDURE — 97112 NEUROMUSCULAR REEDUCATION: CPT

## 2023-12-11 NOTE — FLOWSHEET NOTE
insertion(s) without injection; 1 or 2 muscle(s). (65510) Needle insertion(s) without injection; 3 or more muscle(s)    TREATMENT PLAN   Plan: Cont POC- Continue emphasis/focus on exercise progression, improving proper muscle recruitment and activation/motor control patterns, modulating pain, promoting relaxation, reduce/eliminate soft tissue swelling/inflammation/restriction, and improving soft tissue extensibility. Next visit plan to progress weights, progress reps, and add new exercises     Electronically Signed by Radha Rebolledo PT , DPT             Date: 12/11/2023     Note: If patient does not return for scheduled/recommended follow up visits, this note will serve as a discharge from care along with the most recent update on progress.

## 2023-12-13 ENCOUNTER — HOSPITAL ENCOUNTER (OUTPATIENT)
Dept: PHYSICAL THERAPY | Age: 38
Setting detail: THERAPIES SERIES
Discharge: HOME | End: 2023-12-13
Attending: FAMILY MEDICINE
Payer: COMMERCIAL

## 2023-12-13 PROCEDURE — 97110 THERAPEUTIC EXERCISES: CPT

## 2023-12-13 PROCEDURE — 97140 MANUAL THERAPY 1/> REGIONS: CPT

## 2023-12-13 PROCEDURE — 97112 NEUROMUSCULAR REEDUCATION: CPT

## 2023-12-13 NOTE — FLOWSHEET NOTE
muscle(s). (81989) Needle insertion(s) without injection; 3 or more muscle(s)    TREATMENT PLAN   Plan: Cont POC- Continue emphasis/focus on exercise progression, improving proper muscle recruitment and activation/motor control patterns, modulating pain, promoting relaxation, reduce/eliminate soft tissue swelling/inflammation/restriction, and improving soft tissue extensibility. Next visit plan to progress weights, progress reps, and add new exercises     Electronically Signed by Elaine Carter PT , DPT             Date: 12/13/2023     Note: If patient does not return for scheduled/recommended follow up visits, this note will serve as a discharge from care along with the most recent update on progress.

## 2023-12-18 ENCOUNTER — APPOINTMENT (OUTPATIENT)
Dept: PHYSICAL THERAPY | Age: 38
End: 2023-12-18
Attending: FAMILY MEDICINE
Payer: COMMERCIAL

## 2023-12-27 ENCOUNTER — HOSPITAL ENCOUNTER (OUTPATIENT)
Dept: PHYSICAL THERAPY | Age: 38
Setting detail: THERAPIES SERIES
Discharge: HOME | End: 2023-12-27
Attending: FAMILY MEDICINE
Payer: COMMERCIAL

## 2023-12-27 PROCEDURE — 97140 MANUAL THERAPY 1/> REGIONS: CPT

## 2023-12-27 PROCEDURE — 97112 NEUROMUSCULAR REEDUCATION: CPT

## 2023-12-27 PROCEDURE — 97110 THERAPEUTIC EXERCISES: CPT

## 2023-12-27 NOTE — FLOWSHEET NOTE
[address] [facility], [address] office: [phone] fax: [phone]      Physical Therapy: TREATMENT/PROGRESS NOTE   Patient: Eduarda Duran (24 y.o. male)   Treatment Date: 2023   :  1985 MRN: [id number]   Visit #: 5  Insurance Allowable Auth Needed   / visits 23 to 24  [x]Yes    []No    Insurance: Payor: Preethi Tidwell / Plan: Kaya Sizer / Product Type: *No Product type* /   Insurance ID: [id number] - (Medicaid Managed)  Secondary Insurance (if applicable):    Treatment Diagnosis:     ICD-10-CM    1. Neck pain on right side  M54.2       2. Decreased range of motion of intervertebral discs of cervical spine  M53.82       3. Neck stiffness  M43.6          Medical Diagnosis:    Neck pain [M54.2]  Cervical radiculopathy [M54.12]   Referring Physician: ALBINO Ugarte  PCP: Sera Fontenot DO                             Plan of care signed (Y/N): Y    Date of Patient follow up with Physician: None scheduled     Progress Report/POC: NO  POC update due: (10 visits /OR [address], whichever is less)  2024     No precautions    Preferred Language for Healthcare:   [x]English       []other:    SUBJECTIVE EXAMINATION     Patient Report/Comments: Pt states he is having pain into R arm, neck and shoulder. He notes cont burning in his R UT. Pt is a 35 yo male c/o of chronic neck pain. He has been to PT this year for the same issue, but needed to stop PT due to family issues. He states his neck was injured in high school while lifting weights in . He has neck pain on and off now while watching TV. He has trouble sleeping due to positioning as well. He is getting 5 hours of sleep a night. He is not taking medication for the pain. Pt notes some numbness and tingling in his 4th and 5th digits with sustained holding of objects.         Test used Initial score  23   Pain

## 2024-11-19 ENCOUNTER — OFFICE VISIT (OUTPATIENT)
Dept: PRIMARY CARE CLINIC | Age: 39
End: 2024-11-19

## 2024-11-19 VITALS
HEIGHT: 69 IN | DIASTOLIC BLOOD PRESSURE: 78 MMHG | TEMPERATURE: 97.8 F | WEIGHT: 197 LBS | RESPIRATION RATE: 16 BRPM | SYSTOLIC BLOOD PRESSURE: 118 MMHG | BODY MASS INDEX: 29.18 KG/M2 | HEART RATE: 70 BPM | OXYGEN SATURATION: 98 %

## 2024-11-19 DIAGNOSIS — E55.9 VITAMIN D DEFICIENCY: ICD-10-CM

## 2024-11-19 DIAGNOSIS — M54.31 SCIATICA, RIGHT SIDE: ICD-10-CM

## 2024-11-19 DIAGNOSIS — E78.2 MIXED HYPERLIPIDEMIA: ICD-10-CM

## 2024-11-19 DIAGNOSIS — Z12.5 PROSTATE CANCER SCREENING: ICD-10-CM

## 2024-11-19 DIAGNOSIS — Z12.11 COLON CANCER SCREENING: Primary | ICD-10-CM

## 2024-11-19 DIAGNOSIS — M79.2 RADICULAR PAIN IN RIGHT ARM: ICD-10-CM

## 2024-11-19 RX ORDER — GABAPENTIN 300 MG/1
CAPSULE ORAL
Qty: 90 CAPSULE | Refills: 3 | Status: SHIPPED | OUTPATIENT
Start: 2024-11-19 | End: 2024-12-17

## 2024-11-19 RX ORDER — OXYCODONE AND ACETAMINOPHEN 5; 325 MG/1; MG/1
1 TABLET ORAL EVERY 6 HOURS PRN
Qty: 12 TABLET | Refills: 0 | Status: SHIPPED | OUTPATIENT
Start: 2024-11-19 | End: 2024-11-22

## 2024-11-19 SDOH — ECONOMIC STABILITY: FOOD INSECURITY: WITHIN THE PAST 12 MONTHS, THE FOOD YOU BOUGHT JUST DIDN'T LAST AND YOU DIDN'T HAVE MONEY TO GET MORE.: NEVER TRUE

## 2024-11-19 SDOH — ECONOMIC STABILITY: FOOD INSECURITY: WITHIN THE PAST 12 MONTHS, YOU WORRIED THAT YOUR FOOD WOULD RUN OUT BEFORE YOU GOT MONEY TO BUY MORE.: NEVER TRUE

## 2024-11-19 SDOH — ECONOMIC STABILITY: INCOME INSECURITY: HOW HARD IS IT FOR YOU TO PAY FOR THE VERY BASICS LIKE FOOD, HOUSING, MEDICAL CARE, AND HEATING?: NOT HARD AT ALL

## 2024-11-19 ASSESSMENT — PATIENT HEALTH QUESTIONNAIRE - PHQ9
SUM OF ALL RESPONSES TO PHQ QUESTIONS 1-9: 0
SUM OF ALL RESPONSES TO PHQ QUESTIONS 1-9: 0
1. LITTLE INTEREST OR PLEASURE IN DOING THINGS: NOT AT ALL
SUM OF ALL RESPONSES TO PHQ QUESTIONS 1-9: 0
2. FEELING DOWN, DEPRESSED OR HOPELESS: NOT AT ALL
SUM OF ALL RESPONSES TO PHQ9 QUESTIONS 1 & 2: 0
SUM OF ALL RESPONSES TO PHQ QUESTIONS 1-9: 0

## 2024-11-19 ASSESSMENT — ENCOUNTER SYMPTOMS: BACK PAIN: 1

## 2024-11-19 NOTE — PROGRESS NOTES
pain and neck pain.   Skin: Negative.    Neurological:  Positive for weakness and numbness.   Psychiatric/Behavioral: Negative.            Objective   Physical Exam  Constitutional:       General: He is in acute distress.      Appearance: He is not ill-appearing, toxic-appearing or diaphoretic.      Comments: Patient is in pain with abnormal gait with right leg weakness walking with crutches   HENT:      Right Ear: Tympanic membrane normal.      Left Ear: Tympanic membrane normal.      Nose: Nose normal.   Eyes:      Extraocular Movements: Extraocular movements intact.      Pupils: Pupils are equal, round, and reactive to light.   Neck:      Vascular: No carotid bruit.      Comments: Spasm on right side of neck  Cardiovascular:      Pulses: Normal pulses.      Heart sounds: Normal heart sounds.   Pulmonary:      Effort: Pulmonary effort is normal.      Breath sounds: Normal breath sounds.   Abdominal:      General: Abdomen is flat. There is no distension.      Palpations: Abdomen is soft. There is no mass.      Tenderness: There is no abdominal tenderness. There is no guarding.      Hernia: No hernia is present.   Musculoskeletal:      Cervical back: Tenderness present.      Right lower leg: No edema.      Left lower leg: No edema.      Comments: Right lower back and right trapezius muscle spasm   Lymphadenopathy:      Cervical: No cervical adenopathy.   Neurological:      Mental Status: He is alert.      Motor: Weakness present.      Gait: Gait abnormal.      Comments: Right leg weakness with 3-4 out of 5 strength and right arm weakness.  Decreased vibratory sense of the right leg and foot.  Normal position sense.   Psychiatric:         Mood and Affect: Mood normal.         Behavior: Behavior normal.                An electronic signature was used to authenticate this note.    --SHANTANU ESPOSITO MD

## 2024-11-20 DIAGNOSIS — M54.31 SCIATICA, RIGHT SIDE: ICD-10-CM

## 2024-11-20 DIAGNOSIS — Z12.5 PROSTATE CANCER SCREENING: ICD-10-CM

## 2024-11-20 DIAGNOSIS — E78.2 MIXED HYPERLIPIDEMIA: ICD-10-CM

## 2024-11-20 LAB
BACTERIA URNS QL MICRO: ABNORMAL /HPF
BASOPHILS # BLD: 0.1 K/UL (ref 0–0.2)
BASOPHILS NFR BLD: 0.9 %
BILIRUB UR QL STRIP.AUTO: NEGATIVE
CLARITY UR: CLEAR
COLOR UR: YELLOW
DEPRECATED RDW RBC AUTO: 13.6 % (ref 12.4–15.4)
EOSINOPHIL # BLD: 0.1 K/UL (ref 0–0.6)
EOSINOPHIL NFR BLD: 0.5 %
EPI CELLS #/AREA URNS AUTO: 0 /HPF (ref 0–5)
GLUCOSE UR STRIP.AUTO-MCNC: 250 MG/DL
HCT VFR BLD AUTO: 45.1 % (ref 40.5–52.5)
HGB BLD-MCNC: 14.9 G/DL (ref 13.5–17.5)
HGB UR QL STRIP.AUTO: NEGATIVE
HYALINE CASTS #/AREA URNS AUTO: 0 /LPF (ref 0–8)
KETONES UR STRIP.AUTO-MCNC: NEGATIVE MG/DL
LEUKOCYTE ESTERASE UR QL STRIP.AUTO: NEGATIVE
LYMPHOCYTES # BLD: 3.4 K/UL (ref 1–5.1)
LYMPHOCYTES NFR BLD: 23.4 %
MCH RBC QN AUTO: 31.4 PG (ref 26–34)
MCHC RBC AUTO-ENTMCNC: 32.9 G/DL (ref 31–36)
MCV RBC AUTO: 95.2 FL (ref 80–100)
MONOCYTES # BLD: 1.3 K/UL (ref 0–1.3)
MONOCYTES NFR BLD: 8.5 %
NEUTROPHILS # BLD: 9.8 K/UL (ref 1.7–7.7)
NEUTROPHILS NFR BLD: 66.7 %
NITRITE UR QL STRIP.AUTO: NEGATIVE
PH UR STRIP.AUTO: 6.5 [PH] (ref 5–8)
PLATELET # BLD AUTO: 321 K/UL (ref 135–450)
PMV BLD AUTO: 9.7 FL (ref 5–10.5)
PROT UR STRIP.AUTO-MCNC: NEGATIVE MG/DL
RBC # BLD AUTO: 4.74 M/UL (ref 4.2–5.9)
RBC CLUMPS #/AREA URNS AUTO: 0 /HPF (ref 0–4)
SP GR UR STRIP.AUTO: 1.03 (ref 1–1.03)
UA DIPSTICK W REFLEX MICRO PNL UR: ABNORMAL
URN SPEC COLLECT METH UR: ABNORMAL
UROBILINOGEN UR STRIP-ACNC: 1 E.U./DL
WBC # BLD AUTO: 14.7 K/UL (ref 4–11)
WBC #/AREA URNS AUTO: 1 /HPF (ref 0–5)

## 2024-11-21 LAB
ERYTHROCYTE [SEDIMENTATION RATE] IN BLOOD BY WESTERGREN METHOD: 13 MM/HR (ref 0–15)
PSA SERPL DL<=0.01 NG/ML-MCNC: 2.03 NG/ML (ref 0–4)
TSH SERPL DL<=0.005 MIU/L-ACNC: 1.77 UIU/ML (ref 0.27–4.2)

## 2024-11-22 ASSESSMENT — ENCOUNTER SYMPTOMS
GASTROINTESTINAL NEGATIVE: 1
BOWEL INCONTINENCE: 0
ABDOMINAL PAIN: 0
RESPIRATORY NEGATIVE: 1
EYES NEGATIVE: 1

## 2024-11-22 NOTE — RESULT ENCOUNTER NOTE
The prostate cancer screening test was negative.  Normal thyroid blood test.  The white blood cell count was elevated slightly due to the recent steroids taken.  There was also some glucose in the urine probably from the steroids.  The sed rate or inflammation test was normal.  Normal thyroid blood test.  No signs of infection in the urine.

## 2024-11-26 ENCOUNTER — HOSPITAL ENCOUNTER (OUTPATIENT)
Dept: PHYSICAL THERAPY | Age: 39
Setting detail: THERAPIES SERIES
Discharge: HOME | End: 2024-11-26
Payer: COMMERCIAL

## 2024-11-26 DIAGNOSIS — R29.898 WEAKNESS OF RIGHT SHOULDER: ICD-10-CM

## 2024-11-26 DIAGNOSIS — G89.29 CHRONIC MIDLINE LOW BACK PAIN, UNSPECIFIED WHETHER SCIATICA PRESENT: ICD-10-CM

## 2024-11-26 DIAGNOSIS — R29.898 HIP WEAKNESS: ICD-10-CM

## 2024-11-26 DIAGNOSIS — M54.2 NECK PAIN: Primary | ICD-10-CM

## 2024-11-26 DIAGNOSIS — M54.50 CHRONIC MIDLINE LOW BACK PAIN, UNSPECIFIED WHETHER SCIATICA PRESENT: ICD-10-CM

## 2024-11-26 PROCEDURE — 97110 THERAPEUTIC EXERCISES: CPT

## 2024-11-26 PROCEDURE — 97161 PT EVAL LOW COMPLEX 20 MIN: CPT

## 2024-11-26 NOTE — PLAN OF CARE
[facility]- [facility] [address], [place] [address] office: [phone] fax: [phone]     Physical Therapy Initial Evaluation Certification      Dear Hillary Garland, * ,    We had the pleasure of evaluating the following patient for physical therapy services at [facility].  A summary of our findings can be found in the initial assessment below.  This includes our plan of care.  If you have any questions or concerns regarding these findings, please do not hesitate to contact me at the office phone number listed above.  Thank you for the referral.     Physician Signature:_______________________________Date:__________________  By signing above (or electronic signature), therapist’s plan is approved by physician       Physical Therapy: TREATMENT/PROGRESS NOTE   Patient: Juan A Carlos (39 y.o. male)   Examination Date: 2024   :  1985 MRN: [id number]   Visit #: 1   Insurance Allowable Auth Needed   TBD [x]Yes    []No    Insurance: Payor: [employer] / Plan: [employer] [place] MEDICAID / Product Type: *No Product type* /   Insurance ID: [id number] - (Medicaid Managed)  Secondary Insurance (if applicable):    Treatment Diagnosis:     ICD-10-CM    1. Neck pain  M54.2       2. Chronic midline low back pain, unspecified whether sciatica present  M54.50     G89.29       3. Weakness of right shoulder  R29.898       4. Hip weakness  R29.898          Medical Diagnosis:  Prostate cancer screening [Z12.5]  Radicular pain in right arm [M79.2]   Referring Physician: Hillary Garland,*  PCP: Lissette Comer DO     Plan of care signed (Y/N):     Date of Patient follow up with Physician:      Plan of Care Report: EVAL today  POC update due: (10 visits /OR AUTH LIMITS, whichever is less) 2024                                             Medical History:  Comorbidities:  None  Relevant Medical History: H/o chronic

## 2024-11-29 ENCOUNTER — HOSPITAL ENCOUNTER (OUTPATIENT)
Dept: PHYSICAL THERAPY | Age: 39
Setting detail: THERAPIES SERIES
Discharge: HOME | End: 2024-11-29
Payer: COMMERCIAL

## 2024-11-29 PROCEDURE — 97110 THERAPEUTIC EXERCISES: CPT

## 2024-11-29 PROCEDURE — 97112 NEUROMUSCULAR REEDUCATION: CPT

## 2024-11-29 PROCEDURE — 97140 MANUAL THERAPY 1/> REGIONS: CPT

## 2024-11-29 NOTE — FLOWSHEET NOTE
[facility]- [facility] Therapy [address], [place] [address] office: [phone] fax: [phone]       Physical Therapy: TREATMENT/PROGRESS NOTE   Patient: Juan A Carlos (39 y.o. male)   Examination Date: 2024   :  1985 MRN: [id number]   Visit #: 2   Insurance Allowable Auth Needed   Auth required after 30 [x]Yes    [x]No    Insurance: Payor: [employer] / Plan: [employer] [place] MEDICAID / Product Type: *No Product type* /   Insurance ID: [id number] - (Medicaid Managed)  Secondary Insurance (if applicable):    Treatment Diagnosis:     ICD-10-CM    1. Neck pain  M54.2       2. Chronic midline low back pain, unspecified whether sciatica present  M54.50     G89.29       3. Weakness of right shoulder  R29.898       4. Hip weakness  R29.898          Medical Diagnosis:  Prostate cancer screening [Z12.5]  Radicular pain in right arm [M79.2]   Referring Physician: Hillary Garland,*  PCP: Lissette Comer DO     Plan of care signed (Y/N):     Date of Patient follow up with Physician:      Plan of Care Report: NO  POC update due: (10 visits /OR AUTH LIMITS, whichever is less) 2024                                             Medical History:  Comorbidities:  None  Relevant Medical History: H/o chronic neck and low back pain                                         Precautions/ Contra-indications:           Latex allergy:  NO  Pacemaker:    NO  Contraindications for Manipulation: None  Date of Surgery: na  Other:    Red Flags:  None    Suicide Screening:   The patient did not verbalize a primary behavioral concern, suicidal ideation, suicidal intent, or demonstrate suicidal behaviors.    Preferred Language for Healthcare:   [x] English       [] other:    SUBJECTIVE EXAMINATION     Patient stated complaint: Pt states low back and neck pain kept him up last night. Wants to focus session on low back today.     Test used Initial score  24

## 2024-12-04 ENCOUNTER — HOSPITAL ENCOUNTER (OUTPATIENT)
Dept: GENERAL RADIOLOGY | Age: 39
Discharge: HOME | End: 2024-12-04
Payer: COMMERCIAL

## 2024-12-04 ENCOUNTER — HOSPITAL ENCOUNTER (OUTPATIENT)
Dept: MRI IMAGING | Age: 39
Discharge: HOME | End: 2024-12-04
Payer: COMMERCIAL

## 2024-12-04 DIAGNOSIS — E78.2 MIXED HYPERLIPIDEMIA: ICD-10-CM

## 2024-12-04 DIAGNOSIS — M79.2 RADICULAR PAIN IN RIGHT ARM: ICD-10-CM

## 2024-12-04 DIAGNOSIS — M54.31 SCIATICA, RIGHT SIDE: ICD-10-CM

## 2024-12-04 DIAGNOSIS — E55.9 VITAMIN D DEFICIENCY: ICD-10-CM

## 2024-12-04 LAB
25(OH)D3 SERPL-MCNC: 37.5 NG/ML
ALBUMIN SERPL-MCNC: 4.2 G/DL (ref 3.4–5)
ALBUMIN/GLOB SERPL: 1.4 {RATIO} (ref 1.1–2.2)
ALP SERPL-CCNC: 125 U/L (ref 40–129)
ALT SERPL-CCNC: 30 U/L (ref 10–40)
ANION GAP SERPL CALCULATED.3IONS-SCNC: 10 MMOL/L (ref 3–16)
AST SERPL-CCNC: 17 U/L (ref 15–37)
BILIRUB SERPL-MCNC: 0.6 MG/DL (ref 0–1)
BUN SERPL-MCNC: 18 MG/DL (ref 7–20)
CALCIUM SERPL-MCNC: 10.2 MG/DL (ref 8.3–10.6)
CHLORIDE SERPL-SCNC: 102 MMOL/L (ref 99–110)
CHOLEST SERPL-MCNC: 248 MG/DL (ref 0–199)
CO2 SERPL-SCNC: 27 MMOL/L (ref 21–32)
CREAT SERPL-MCNC: 1 MG/DL (ref 0.9–1.3)
GFR SERPLBLD CREATININE-BSD FMLA CKD-EPI: >90 ML/MIN/{1.73_M2}
GLUCOSE SERPL-MCNC: 134 MG/DL (ref 70–99)
HDLC SERPL-MCNC: 28 MG/DL (ref 40–60)
LDLC SERPL CALC-MCNC: ABNORMAL MG/DL
LDLC SERPL-MCNC: 159 MG/DL
POTASSIUM SERPL-SCNC: 4.7 MMOL/L (ref 3.5–5.1)
PROT SERPL-MCNC: 7.2 G/DL (ref 6.4–8.2)
SODIUM SERPL-SCNC: 139 MMOL/L (ref 136–145)
TRIGL SERPL-MCNC: 314 MG/DL (ref 0–150)
VLDLC SERPL CALC-MCNC: ABNORMAL MG/DL

## 2024-12-04 PROCEDURE — 72100 X-RAY EXAM L-S SPINE 2/3 VWS: CPT

## 2024-12-04 PROCEDURE — 72040 X-RAY EXAM NECK SPINE 2-3 VW: CPT

## 2024-12-04 PROCEDURE — 72148 MRI LUMBAR SPINE W/O DYE: CPT

## 2024-12-04 PROCEDURE — 72141 MRI NECK SPINE W/O DYE: CPT

## 2024-12-05 ENCOUNTER — HOSPITAL ENCOUNTER (OUTPATIENT)
Dept: PHYSICAL THERAPY | Age: 39
Setting detail: THERAPIES SERIES
Discharge: HOME | End: 2024-12-05
Payer: COMMERCIAL

## 2024-12-05 PROCEDURE — 97110 THERAPEUTIC EXERCISES: CPT

## 2024-12-05 PROCEDURE — 97140 MANUAL THERAPY 1/> REGIONS: CPT

## 2024-12-05 PROCEDURE — 97112 NEUROMUSCULAR REEDUCATION: CPT

## 2024-12-05 NOTE — FLOWSHEET NOTE
kinesthetic sense, posture, and/or proprioception for sitting and/or standing activities    (99813) MANUAL THERAPY -  Manual therapy techniques, 1 or more regions, each 15 minutes (Mobilization/manipulation, manual lymphatic drainage, manual traction) for the purpose of modulating pain, promoting relaxation,  increasing ROM, reducing/eliminating soft tissue swelling/inflammation/restriction, improving soft tissue extensibility and allowing for proper ROM for normal function with self care, mobility, lifting and ambulation    GOALS     Patient stated goal: Perform regular activity pain-free.  [] Progressing: [] Met: [] Not Met: [] Adjusted    Therapist goals for Patient:   Short Term Goals: To be achieved in: 4 weeks  1. Independent in HEP and progression per patient tolerance, in order to prevent re-injury.   [] Progressing: [] Met: [] Not Met: [] Adjusted  2. Patient will have a decrease in pain to <3/10 to facilitate improvement in movement, function, and ADLs as indicated by Functional Deficits.  [] Progressing: [] Met: [] Not Met: [] Adjusted  3. Patient will report a decrease in sleep disturbances by 50%.   [] Progressing: [] Met: [] Not Met: [] Adjusted    IF APPLICABLE:  [] Patient to demonstrate independence in wear and care for custom orthotic device. (Only if applicable for orthotic eval)     Long Term Goals: To be achieved in: 8 weeks  1a. Disability index score of 25% or less for the Modified Oswestry to assist with reaching prior level of function with activities such as prolonged sitting.  [] Progressing: [] Met: [] Not Met: [] Adjusted  1b. Disability index score of 25% or less for the Neck Disability Index to assist with reaching prior level of function with activities such as turning head to check blindspot while driving.  [] Progressing: [] Met: [] Not Met: [] Adjusted  2. Patient will demonstrate increased AROM of lumbar flexion to WNL without pain to allow for proper joint functioning to enable

## 2024-12-07 LAB — PHYTONADIONE SERPL-MCNC: 1.12 NMOL/L (ref 0.22–4.88)

## 2024-12-10 ENCOUNTER — HOSPITAL ENCOUNTER (OUTPATIENT)
Dept: PHYSICAL THERAPY | Age: 39
Setting detail: THERAPIES SERIES
Discharge: HOME | End: 2024-12-10
Payer: COMMERCIAL

## 2024-12-10 PROCEDURE — 97110 THERAPEUTIC EXERCISES: CPT

## 2024-12-10 PROCEDURE — 97140 MANUAL THERAPY 1/> REGIONS: CPT

## 2024-12-10 PROCEDURE — 97112 NEUROMUSCULAR REEDUCATION: CPT

## 2024-12-10 NOTE — FLOWSHEET NOTE
allowing for proper ROM for normal function with self care, mobility, lifting and ambulation    GOALS     Patient stated goal: Perform regular activity pain-free.  [] Progressing: [] Met: [] Not Met: [] Adjusted    Therapist goals for Patient:   Short Term Goals: To be achieved in: 4 weeks  1. Independent in HEP and progression per patient tolerance, in order to prevent re-injury.   [] Progressing: [] Met: [] Not Met: [] Adjusted  2. Patient will have a decrease in pain to <3/10 to facilitate improvement in movement, function, and ADLs as indicated by Functional Deficits.  [] Progressing: [] Met: [] Not Met: [] Adjusted  3. Patient will report a decrease in sleep disturbances by 50%.   [] Progressing: [] Met: [] Not Met: [] Adjusted    IF APPLICABLE:  [] Patient to demonstrate independence in wear and care for custom orthotic device. (Only if applicable for orthotic eval)     Long Term Goals: To be achieved in: 8 weeks  1a. Disability index score of 25% or less for the Modified Oswestry to assist with reaching prior level of function with activities such as prolonged sitting.  [] Progressing: [] Met: [] Not Met: [] Adjusted  1b. Disability index score of 25% or less for the Neck Disability Index to assist with reaching prior level of function with activities such as turning head to check blindspot while driving.  [] Progressing: [] Met: [] Not Met: [] Adjusted  2. Patient will demonstrate increased AROM of lumbar flexion to WNL without pain to allow for proper joint functioning to enable patient to stoop over to tie shoes.   [] Progressing: [] Met: [] Not Met: [] Adjusted  3a. Patient will demonstrate increased Strength of B hips to at least 5/5 throughout without pain to allow for proper functional mobility to enable patient to return to prolonged walking with ease.   [] Progressing: [] Met: [] Not Met: [] Adjusted  3b. Patient will demonstrate increased Strength of R shoulder to at least 5/5 throughout without

## 2024-12-19 DIAGNOSIS — E78.2 MIXED HYPERLIPIDEMIA: ICD-10-CM

## 2024-12-19 DIAGNOSIS — R73.9 BLOOD GLUCOSE ELEVATED: Primary | ICD-10-CM

## 2024-12-19 RX ORDER — ATORVASTATIN CALCIUM 20 MG/1
20 TABLET, FILM COATED ORAL DAILY
Qty: 90 TABLET | Refills: 1 | Status: SHIPPED | OUTPATIENT
Start: 2024-12-19

## 2024-12-23 ENCOUNTER — TELEPHONE (OUTPATIENT)
Dept: PRIMARY CARE CLINIC | Age: 39
End: 2024-12-23

## 2024-12-23 DIAGNOSIS — E56.1 VITAMIN K DEFICIENCY: Primary | ICD-10-CM

## 2024-12-23 NOTE — TELEPHONE ENCOUNTER
Pt was given his test results. He states that he asked for a vit K2 test to be ordered as well. He will go to the lab to have the A1c done, can you place the k2 order please.     Please advise.

## 2024-12-24 DIAGNOSIS — Z80.0 FAMILY HISTORY OF COLON CANCER: Primary | ICD-10-CM

## 2024-12-24 DIAGNOSIS — E56.9 VITAMIN DEFICIENCY: ICD-10-CM

## 2024-12-24 DIAGNOSIS — Z12.11 COLON CANCER SCREENING: ICD-10-CM

## 2024-12-24 NOTE — TELEPHONE ENCOUNTER
Pt has been informed.   Pt request a order for vit K2 so that he can take to another lab.  Pt also request colon cancer screening, he states that his dad's brother had colon cancer.      Please advise.